# Patient Record
Sex: FEMALE | Race: WHITE | Employment: UNEMPLOYED | URBAN - METROPOLITAN AREA
[De-identification: names, ages, dates, MRNs, and addresses within clinical notes are randomized per-mention and may not be internally consistent; named-entity substitution may affect disease eponyms.]

---

## 2017-01-03 ENCOUNTER — ROUTINE PRENATAL (OUTPATIENT)
Dept: OBGYN CLINIC | Facility: CLINIC | Age: 32
End: 2017-01-03

## 2017-01-03 VITALS
WEIGHT: 133 LBS | DIASTOLIC BLOOD PRESSURE: 68 MMHG | BODY MASS INDEX: 23 KG/M2 | SYSTOLIC BLOOD PRESSURE: 106 MMHG | HEART RATE: 68 BPM

## 2017-01-03 DIAGNOSIS — Z34.92 ENCOUNTER FOR SUPERVISION OF NORMAL PREGNANCY IN SECOND TRIMESTER, UNSPECIFIED GRAVIDITY: Primary | ICD-10-CM

## 2017-01-03 LAB
MULTISTIX LOT#: NORMAL NUMERIC
PH, URINE: 5 (ref 4.5–8)
SPECIFIC GRAVITY: 1.03 (ref 1–1.03)
UROBILINOGEN,SEMI-QN: 0.2 MG/DL (ref 0–1.9)

## 2017-01-09 ENCOUNTER — HOSPITAL ENCOUNTER (OUTPATIENT)
Dept: ULTRASOUND IMAGING | Facility: HOSPITAL | Age: 32
Discharge: HOME | End: 2017-01-09
Attending: OBSTETRICS & GYNECOLOGY
Payer: COMMERCIAL

## 2017-01-09 DIAGNOSIS — Z34.92 ENCOUNTER FOR SUPERVISION OF NORMAL PREGNANCY IN SECOND TRIMESTER, UNSPECIFIED GRAVIDITY: ICD-10-CM

## 2017-01-09 PROCEDURE — 76805 OB US >/= 14 WKS SNGL FETUS: CPT

## 2017-01-31 ENCOUNTER — ROUTINE PRENATAL (OUTPATIENT)
Dept: OBGYN CLINIC | Facility: CLINIC | Age: 32
End: 2017-01-31

## 2017-01-31 ENCOUNTER — TELEPHONE (OUTPATIENT)
Dept: OBGYN CLINIC | Facility: CLINIC | Age: 32
End: 2017-01-31

## 2017-01-31 VITALS
WEIGHT: 136 LBS | DIASTOLIC BLOOD PRESSURE: 66 MMHG | SYSTOLIC BLOOD PRESSURE: 104 MMHG | HEART RATE: 76 BPM | BODY MASS INDEX: 23 KG/M2

## 2017-01-31 DIAGNOSIS — Z34.02 ENCOUNTER FOR SUPERVISION OF NORMAL FIRST PREGNANCY IN SECOND TRIMESTER: Primary | ICD-10-CM

## 2017-01-31 LAB
APPEARANCE: CLEAR
MULTISTIX LOT#: NORMAL NUMERIC
URINE-COLOR: YELLOW

## 2017-02-01 NOTE — PROGRESS NOTES
Reviewed US with patient. Patient received 24-28 week lab orders. Reviewed increased hydration.    RTC 4 wks

## 2017-02-23 ENCOUNTER — TELEPHONE (OUTPATIENT)
Dept: OBGYN CLINIC | Facility: CLINIC | Age: 32
End: 2017-02-23

## 2017-02-24 ENCOUNTER — APPOINTMENT (OUTPATIENT)
Dept: LAB | Facility: HOSPITAL | Age: 32
End: 2017-02-24
Attending: OBSTETRICS & GYNECOLOGY
Payer: COMMERCIAL

## 2017-02-24 DIAGNOSIS — Z34.02 ENCOUNTER FOR SUPERVISION OF NORMAL FIRST PREGNANCY IN SECOND TRIMESTER: ICD-10-CM

## 2017-02-24 LAB
ERYTHROCYTE [DISTWIDTH] IN BLOOD BY AUTOMATED COUNT: 13.2 % (ref 11–15)
GLUCOSE 1H P 50 G GLC PO SERPL-MCNC: 89 MG/DL
HCT VFR BLD AUTO: 36.2 % (ref 35–48)
HGB BLD-MCNC: 12.5 G/DL (ref 12–16)
MCH RBC QN AUTO: 32.4 PG (ref 27–32)
MCHC RBC AUTO-ENTMCNC: 34.4 G/DL (ref 32–37)
MCV RBC AUTO: 94.2 FL (ref 80–100)
PLATELET # BLD AUTO: 182 K/UL (ref 140–400)
PMV BLD AUTO: 8 FL (ref 7.4–10.3)
RBC # BLD AUTO: 3.84 M/UL (ref 3.7–5.4)
WBC # BLD AUTO: 13.8 K/UL (ref 4–11)

## 2017-02-24 PROCEDURE — 82950 GLUCOSE TEST: CPT

## 2017-02-24 PROCEDURE — 36415 COLL VENOUS BLD VENIPUNCTURE: CPT

## 2017-02-24 PROCEDURE — 85027 COMPLETE CBC AUTOMATED: CPT

## 2017-03-01 ENCOUNTER — ROUTINE PRENATAL (OUTPATIENT)
Dept: OBGYN CLINIC | Facility: CLINIC | Age: 32
End: 2017-03-01

## 2017-03-01 VITALS
DIASTOLIC BLOOD PRESSURE: 72 MMHG | BODY MASS INDEX: 25 KG/M2 | HEART RATE: 86 BPM | WEIGHT: 143 LBS | SYSTOLIC BLOOD PRESSURE: 107 MMHG

## 2017-03-01 DIAGNOSIS — Z34.92 ENCOUNTER FOR SUPERVISION OF NORMAL PREGNANCY IN SECOND TRIMESTER, UNSPECIFIED GRAVIDITY: Primary | ICD-10-CM

## 2017-03-01 LAB
APPEARANCE: CLEAR
MULTISTIX EXPIRATION DATE: ABNORMAL DATE
MULTISTIX LOT#: ABNORMAL NUMERIC
PH, URINE: 5 (ref 4.5–8)
SPECIFIC GRAVITY: 1 (ref 1–1.03)
URINE-COLOR: YELLOW

## 2017-03-13 ENCOUNTER — ROUTINE PRENATAL (OUTPATIENT)
Dept: OBGYN CLINIC | Facility: CLINIC | Age: 32
End: 2017-03-13

## 2017-03-13 VITALS
SYSTOLIC BLOOD PRESSURE: 102 MMHG | BODY MASS INDEX: 25 KG/M2 | HEART RATE: 72 BPM | DIASTOLIC BLOOD PRESSURE: 74 MMHG | WEIGHT: 143 LBS

## 2017-03-13 DIAGNOSIS — Z34.03 ENCOUNTER FOR SUPERVISION OF NORMAL FIRST PREGNANCY IN THIRD TRIMESTER: Primary | ICD-10-CM

## 2017-03-13 LAB
APPEARANCE: CLEAR
MULTISTIX LOT#: NORMAL NUMERIC
URINE-COLOR: YELLOW

## 2017-03-13 PROCEDURE — 90471 IMMUNIZATION ADMIN: CPT | Performed by: OBSTETRICS & GYNECOLOGY

## 2017-03-13 PROCEDURE — 90715 TDAP VACCINE 7 YRS/> IM: CPT | Performed by: OBSTETRICS & GYNECOLOGY

## 2017-03-13 NOTE — PROGRESS NOTES
PT SIGNED TDAP CONSENT AND WAS GIVEN TDAP INFO SHEET. PT TOLERATED INJECTION WITHOUT INCIDENT. ADVISED TO CALL IF ANY QUESTIONS OR CONCERNS.

## 2017-03-31 ENCOUNTER — ROUTINE PRENATAL (OUTPATIENT)
Dept: OBGYN CLINIC | Facility: CLINIC | Age: 32
End: 2017-03-31

## 2017-03-31 VITALS
BODY MASS INDEX: 25 KG/M2 | DIASTOLIC BLOOD PRESSURE: 68 MMHG | HEART RATE: 80 BPM | WEIGHT: 144 LBS | SYSTOLIC BLOOD PRESSURE: 109 MMHG

## 2017-03-31 DIAGNOSIS — Z34.83 ENCOUNTER FOR SUPERVISION OF OTHER NORMAL PREGNANCY IN THIRD TRIMESTER: Primary | ICD-10-CM

## 2017-03-31 LAB
APPEARANCE: CLEAR
MULTISTIX LOT#: NORMAL NUMERIC
PH, URINE: 5 (ref 4.5–8)
SPECIFIC GRAVITY: 1.01 (ref 1–1.03)
URINE-COLOR: YELLOW

## 2017-04-13 ENCOUNTER — ROUTINE PRENATAL (OUTPATIENT)
Dept: OBGYN CLINIC | Facility: CLINIC | Age: 32
End: 2017-04-13

## 2017-04-13 VITALS
SYSTOLIC BLOOD PRESSURE: 108 MMHG | BODY MASS INDEX: 26 KG/M2 | HEART RATE: 91 BPM | WEIGHT: 149 LBS | DIASTOLIC BLOOD PRESSURE: 68 MMHG

## 2017-04-13 DIAGNOSIS — Z34.93 ENCOUNTER FOR SUPERVISION OF NORMAL PREGNANCY IN THIRD TRIMESTER, UNSPECIFIED GRAVIDITY: Primary | ICD-10-CM

## 2017-04-21 ENCOUNTER — APPOINTMENT (OUTPATIENT)
Dept: LAB | Facility: HOSPITAL | Age: 32
End: 2017-04-21
Attending: OBSTETRICS & GYNECOLOGY
Payer: COMMERCIAL

## 2017-04-21 DIAGNOSIS — Z34.93 ENCOUNTER FOR SUPERVISION OF NORMAL PREGNANCY IN THIRD TRIMESTER, UNSPECIFIED GRAVIDITY: ICD-10-CM

## 2017-04-21 PROCEDURE — 36415 COLL VENOUS BLD VENIPUNCTURE: CPT

## 2017-04-21 PROCEDURE — 85027 COMPLETE CBC AUTOMATED: CPT

## 2017-04-21 PROCEDURE — 86780 TREPONEMA PALLIDUM: CPT

## 2017-04-25 ENCOUNTER — ROUTINE PRENATAL (OUTPATIENT)
Dept: OBGYN CLINIC | Facility: CLINIC | Age: 32
End: 2017-04-25

## 2017-04-25 VITALS
WEIGHT: 150 LBS | BODY MASS INDEX: 26 KG/M2 | DIASTOLIC BLOOD PRESSURE: 69 MMHG | HEART RATE: 79 BPM | SYSTOLIC BLOOD PRESSURE: 104 MMHG

## 2017-04-25 DIAGNOSIS — Z34.93 ENCOUNTER FOR SUPERVISION OF NORMAL PREGNANCY IN THIRD TRIMESTER, UNSPECIFIED GRAVIDITY: Primary | ICD-10-CM

## 2017-04-25 NOTE — PROGRESS NOTES
GBS done today. Reviewed 35 week labs with the patient. Discussed kick counts. She has no complaints.    RTC 1 wk

## 2017-05-03 ENCOUNTER — ROUTINE PRENATAL (OUTPATIENT)
Dept: OBGYN CLINIC | Facility: CLINIC | Age: 32
End: 2017-05-03

## 2017-05-03 ENCOUNTER — TELEPHONE (OUTPATIENT)
Dept: OBGYN CLINIC | Facility: CLINIC | Age: 32
End: 2017-05-03

## 2017-05-03 ENCOUNTER — HOSPITAL ENCOUNTER (OUTPATIENT)
Dept: ULTRASOUND IMAGING | Facility: HOSPITAL | Age: 32
Discharge: HOME | End: 2017-05-03
Attending: OBSTETRICS & GYNECOLOGY
Payer: COMMERCIAL

## 2017-05-03 VITALS
WEIGHT: 152 LBS | DIASTOLIC BLOOD PRESSURE: 69 MMHG | SYSTOLIC BLOOD PRESSURE: 96 MMHG | BODY MASS INDEX: 26 KG/M2 | HEART RATE: 92 BPM

## 2017-05-03 DIAGNOSIS — O26.843 SIGNIFICANT DISCREPANCY BETWEEN UTERINE SIZE AND CLINICAL DATES, ANTEPARTUM, THIRD TRIMESTER: ICD-10-CM

## 2017-05-03 DIAGNOSIS — Z34.93 ENCOUNTER FOR SUPERVISION OF NORMAL PREGNANCY IN THIRD TRIMESTER, UNSPECIFIED GRAVIDITY: Primary | ICD-10-CM

## 2017-05-03 PROBLEM — O26.849 SIGNIFICANT DISCREPANCY BETWEEN UTERINE SIZE AND CLINICAL DATES, ANTEPARTUM: Status: ACTIVE | Noted: 2017-05-03

## 2017-05-03 PROBLEM — O26.849 SIGNIFICANT DISCREPANCY BETWEEN UTERINE SIZE AND CLINICAL DATES, ANTEPARTUM (HCC): Status: ACTIVE | Noted: 2017-05-03

## 2017-05-03 PROCEDURE — 76816 OB US FOLLOW-UP PER FETUS: CPT

## 2017-05-04 NOTE — PROGRESS NOTES
Bevtoft at visit. GBS neg. Discussed S<D and office HANNA gives:  8.3, 2.9, 3.2, 7.2 =  21.6 cm. Mayo ESTEVES now at [address] [place] [address] as hold and call for me.

## 2017-05-04 NOTE — TELEPHONE ENCOUNTER
Lmtcb. Patient's order routed to dmg and patient can call [phone] to schedule the appointment.

## 2017-05-04 NOTE — TELEPHONE ENCOUNTER
Paged with stat US results giving normal growth at 52% / HANNA = 16 cm but incidental finding of right renal pelviectasis at 10.3mm. I discussed with Jimbo Chiang and Linda Unger that we'll do f/u with [facility].  [facility] for right renal fet

## 2017-05-05 ENCOUNTER — TELEPHONE (OUTPATIENT)
Dept: OBGYN CLINIC | Facility: CLINIC | Age: 32
End: 2017-05-05

## 2017-05-05 ENCOUNTER — LAB ENCOUNTER (OUTPATIENT)
Dept: LAB | Facility: HOSPITAL | Age: 32
End: 2017-05-05
Attending: OBSTETRICS & GYNECOLOGY
Payer: COMMERCIAL

## 2017-05-05 DIAGNOSIS — D72.829 LEUKOCYTOSIS, UNSPECIFIED TYPE: Primary | ICD-10-CM

## 2017-05-05 DIAGNOSIS — D72.829 LEUKOCYTOSIS, UNSPECIFIED TYPE: ICD-10-CM

## 2017-05-05 PROCEDURE — 85025 COMPLETE CBC W/AUTO DIFF WBC: CPT

## 2017-05-05 PROCEDURE — 36415 COLL VENOUS BLD VENIPUNCTURE: CPT

## 2017-05-05 NOTE — TELEPHONE ENCOUNTER
----- Message from Alia Wang DO sent at 5/4/2017  9:35 PM CDT -----  Please repeat cbc in 1-2 wks from the last.  WBCs elevated. Please see if patient recently sick when blood work drawn.

## 2017-05-09 ENCOUNTER — HOSPITAL ENCOUNTER (OUTPATIENT)
Dept: PERINATAL CARE | Facility: HOSPITAL | Age: 32
Discharge: HOME | End: 2017-05-09
Attending: OBSTETRICS & GYNECOLOGY
Payer: COMMERCIAL

## 2017-05-09 ENCOUNTER — HOSPITAL ENCOUNTER (OUTPATIENT)
Facility: HOSPITAL | Age: 32
Discharge: HOME | End: 2017-05-09
Attending: OBSTETRICS & GYNECOLOGY | Admitting: OBSTETRICS & GYNECOLOGY
Payer: COMMERCIAL

## 2017-05-09 VITALS — HEART RATE: 79 BPM | DIASTOLIC BLOOD PRESSURE: 77 MMHG | SYSTOLIC BLOOD PRESSURE: 116 MMHG

## 2017-05-09 DIAGNOSIS — O26.843 SIGNIFICANT DISCREPANCY BETWEEN UTERINE SIZE AND CLINICAL DATES, ANTEPARTUM, THIRD TRIMESTER: ICD-10-CM

## 2017-05-09 DIAGNOSIS — O35.8XX0 ULTRASOUND RECHECK OF FETAL PYELECTASIS, ANTEPARTUM, NOT APPLICABLE OR UNSPECIFIED FETUS: ICD-10-CM

## 2017-05-09 DIAGNOSIS — O26.843 SIGNIFICANT DISCREPANCY BETWEEN UTERINE SIZE AND CLINICAL DATES, ANTEPARTUM, THIRD TRIMESTER: Primary | ICD-10-CM

## 2017-05-09 PROCEDURE — 76811 OB US DETAILED SNGL FETUS: CPT | Performed by: OBSTETRICS & GYNECOLOGY

## 2017-05-09 PROCEDURE — 99241 OFFICE CONSULTATION,LEVEL I: CPT | Performed by: OBSTETRICS & GYNECOLOGY

## 2017-05-09 PROCEDURE — 76819 FETAL BIOPHYS PROFIL W/O NST: CPT | Performed by: OBSTETRICS & GYNECOLOGY

## 2017-05-09 NOTE — PROGRESS NOTES
Outpatient Maternal-Fetal Medicine Consultation    Dear Dr. Juanis Gomez,    Thank you for requesting ultrasound evaluation and maternal fetal medicine consultation on your patient Ramin Petersen.   As you are aware she is a 29year old female with a Singleto fetal measurements consistent with dates and normal detailed fetal anatomic evaluation. BPP is 8/8. EFW 6 lbs 3 oz (22nd percentile). I interpreted the results and reviewed them with the patient and her , Saman Godinez.       DISCUSSION  During her visit M.D.    The majority of the time (>50%) was spent in review of records, consultation and coordination of care. Our discussion is summarized above. The approximate physician face-to-face time was 20 minutes.

## 2017-05-09 NOTE — TELEPHONE ENCOUNTER
Repeat CBC done on 5/5/17 is resulted. Routed to [address] [place] [address] to please review and advise.

## 2017-05-10 ENCOUNTER — ROUTINE PRENATAL (OUTPATIENT)
Dept: OBGYN CLINIC | Facility: CLINIC | Age: 32
End: 2017-05-10

## 2017-05-10 VITALS
SYSTOLIC BLOOD PRESSURE: 106 MMHG | WEIGHT: 152.38 LBS | BODY MASS INDEX: 26 KG/M2 | DIASTOLIC BLOOD PRESSURE: 70 MMHG | HEART RATE: 85 BPM

## 2017-05-10 DIAGNOSIS — Z34.93 ENCOUNTER FOR SUPERVISION OF NORMAL PREGNANCY IN THIRD TRIMESTER, UNSPECIFIED GRAVIDITY: Primary | ICD-10-CM

## 2017-05-10 PROBLEM — O35.8XX0 PYELECTASIS OF FETUS ON PRENATAL ULTRASOUND: Status: ACTIVE | Noted: 2017-05-10

## 2017-05-10 PROBLEM — O35.EXX0 PYELECTASIS OF FETUS ON PRENATAL ULTRASOUND (HCC): Status: ACTIVE | Noted: 2017-05-10

## 2017-05-10 PROBLEM — O35.EXX0 PYELECTASIS OF FETUS ON PRENATAL ULTRASOUND: Status: ACTIVE | Noted: 2017-05-10

## 2017-05-10 NOTE — ADDENDUM NOTE
Encounter addended by: Charlotte Gilbert on: 5/10/2017  8:39 AM<BR>     Documentation filed: Charges VN

## 2017-05-10 NOTE — PROGRESS NOTES
CBC reviewed and WBC = 15.7 which is variant of pregnancy ( down from 18K ). Normal differential.  I'll bring to group discussion. I had reviewed Level 1 result over phone on day of US. Showed right pyelectasis at 10.3 mm.  Sent for MFM scan yesterday and E

## 2017-05-18 ENCOUNTER — ROUTINE PRENATAL (OUTPATIENT)
Dept: OBGYN CLINIC | Facility: CLINIC | Age: 32
End: 2017-05-18

## 2017-05-18 VITALS
DIASTOLIC BLOOD PRESSURE: 72 MMHG | WEIGHT: 152.19 LBS | HEART RATE: 77 BPM | BODY MASS INDEX: 26 KG/M2 | SYSTOLIC BLOOD PRESSURE: 106 MMHG

## 2017-05-18 DIAGNOSIS — Z34.83 ENCOUNTER FOR SUPERVISION OF OTHER NORMAL PREGNANCY IN THIRD TRIMESTER: Primary | ICD-10-CM

## 2017-05-25 ENCOUNTER — ROUTINE PRENATAL (OUTPATIENT)
Dept: OBGYN CLINIC | Facility: CLINIC | Age: 32
End: 2017-05-25

## 2017-05-25 VITALS
WEIGHT: 154 LBS | BODY MASS INDEX: 26 KG/M2 | SYSTOLIC BLOOD PRESSURE: 108 MMHG | HEART RATE: 80 BPM | DIASTOLIC BLOOD PRESSURE: 71 MMHG

## 2017-05-25 DIAGNOSIS — Z34.93 ENCOUNTER FOR SUPERVISION OF NORMAL PREGNANCY IN THIRD TRIMESTER, UNSPECIFIED GRAVIDITY: Primary | ICD-10-CM

## 2017-06-01 ENCOUNTER — HOSPITAL ENCOUNTER (OUTPATIENT)
Facility: HOSPITAL | Age: 32
Discharge: HOME | End: 2017-06-01
Attending: OBSTETRICS & GYNECOLOGY | Admitting: OBSTETRICS & GYNECOLOGY
Payer: COMMERCIAL

## 2017-06-01 ENCOUNTER — ROUTINE PRENATAL (OUTPATIENT)
Dept: OBGYN CLINIC | Facility: CLINIC | Age: 32
End: 2017-06-01

## 2017-06-01 VITALS
WEIGHT: 155.19 LBS | DIASTOLIC BLOOD PRESSURE: 78 MMHG | BODY MASS INDEX: 27 KG/M2 | SYSTOLIC BLOOD PRESSURE: 113 MMHG | HEART RATE: 78 BPM

## 2017-06-01 VITALS — DIASTOLIC BLOOD PRESSURE: 78 MMHG | HEART RATE: 70 BPM | SYSTOLIC BLOOD PRESSURE: 129 MMHG

## 2017-06-01 DIAGNOSIS — Z34.83 ENCOUNTER FOR SUPERVISION OF OTHER NORMAL PREGNANCY IN THIRD TRIMESTER: Primary | ICD-10-CM

## 2017-06-01 PROCEDURE — 59426 ANTEPARTUM CARE ONLY: CPT | Performed by: OBSTETRICS & GYNECOLOGY

## 2017-06-01 PROCEDURE — 59025 FETAL NON-STRESS TEST: CPT | Performed by: OBSTETRICS & GYNECOLOGY

## 2017-06-01 NOTE — PROGRESS NOTES
No issues reported. HANNA: 20cm. To [facility] for NST. IOL Monday night ripening @ 6pm.  POC disucssed with patient. Precautions given.

## 2017-06-01 NOTE — NST
Nonstress Test   Patient: Kaitlynn Saenz    Gestation: 40w6d    NST: postdates       Variability: Moderate           Accelerations: Yes           Decelerations: None            Baseline: 125 BPM           Uterine Irritability: No           Contractions:

## 2017-06-02 ENCOUNTER — TELEPHONE (OUTPATIENT)
Dept: OBGYN CLINIC | Facility: CLINIC | Age: 32
End: 2017-06-02

## 2017-06-02 NOTE — TELEPHONE ENCOUNTER
May from [facility] states IOL spot for Sunday night has opened up and Reilly Penny has been moved from Monday to Sunday night.   Pt aware of IOL @ 7pm.

## 2017-06-04 ENCOUNTER — HOSPITAL ENCOUNTER (INPATIENT)
Facility: HOSPITAL | Age: 32
LOS: 3 days | Discharge: HOME | End: 2017-06-07
Attending: OBSTETRICS & GYNECOLOGY | Admitting: OBSTETRICS & GYNECOLOGY
Payer: COMMERCIAL

## 2017-06-04 ENCOUNTER — HOSPITAL ENCOUNTER (INPATIENT)
Dept: OBGYN CLINIC | Facility: HOSPITAL | Age: 32
Discharge: HOME | End: 2017-06-04
Payer: COMMERCIAL

## 2017-06-04 PROBLEM — O48.0 41 WEEKS GESTATION OF PREGNANCY: Status: ACTIVE | Noted: 2017-06-04

## 2017-06-04 PROBLEM — Z3A.41 41 WEEKS GESTATION OF PREGNANCY: Status: ACTIVE | Noted: 2017-06-04

## 2017-06-04 PROBLEM — Z3A.41 41 WEEKS GESTATION OF PREGNANCY (HCC): Status: ACTIVE | Noted: 2017-06-04

## 2017-06-04 PROBLEM — O48.0 41 WEEKS GESTATION OF PREGNANCY (HCC): Status: ACTIVE | Noted: 2017-06-04

## 2017-06-04 PROCEDURE — 3E0P7GC INTRODUCTION OF OTHER THERAPEUTIC SUBSTANCE INTO FEMALE REPRODUCTIVE, VIA NATURAL OR ARTIFICIAL OPENING: ICD-10-PCS | Performed by: OBSTETRICS & GYNECOLOGY

## 2017-06-04 RX ORDER — IBUPROFEN 600 MG/1
600 TABLET ORAL ONCE AS NEEDED
Status: DISCONTINUED | OUTPATIENT
Start: 2017-06-04 | End: 2017-06-06

## 2017-06-04 RX ORDER — AMMONIA INHALANTS 0.04 G/.3ML
0.3 INHALANT RESPIRATORY (INHALATION) AS NEEDED
Status: DISCONTINUED | OUTPATIENT
Start: 2017-06-04 | End: 2017-06-06

## 2017-06-04 RX ORDER — TERBUTALINE SULFATE 1 MG/ML
0.25 INJECTION, SOLUTION SUBCUTANEOUS AS NEEDED
Status: DISCONTINUED | OUTPATIENT
Start: 2017-06-04 | End: 2017-06-06

## 2017-06-04 RX ORDER — SODIUM CHLORIDE 0.9 % (FLUSH) 0.9 %
10 SYRINGE (ML) INJECTION AS NEEDED
Status: DISCONTINUED | OUTPATIENT
Start: 2017-06-04 | End: 2017-06-06

## 2017-06-04 RX ORDER — DEXTROSE, SODIUM CHLORIDE, SODIUM LACTATE, POTASSIUM CHLORIDE, AND CALCIUM CHLORIDE 5; .6; .31; .03; .02 G/100ML; G/100ML; G/100ML; G/100ML; G/100ML
125 INJECTION, SOLUTION INTRAVENOUS CONTINUOUS
Status: DISCONTINUED | OUTPATIENT
Start: 2017-06-04 | End: 2017-06-06

## 2017-06-04 RX ORDER — LIDOCAINE HYDROCHLORIDE 10 MG/ML
30 INJECTION, SOLUTION EPIDURAL; INFILTRATION; INTRACAUDAL; PERINEURAL ONCE
Status: DISCONTINUED | OUTPATIENT
Start: 2017-06-04 | End: 2017-06-06

## 2017-06-05 ENCOUNTER — ANESTHESIA (OUTPATIENT)
Dept: OBGYN UNIT | Facility: HOSPITAL | Age: 32
End: 2017-06-05
Payer: COMMERCIAL

## 2017-06-05 ENCOUNTER — ANESTHESIA EVENT (OUTPATIENT)
Dept: OBGYN UNIT | Facility: HOSPITAL | Age: 32
End: 2017-06-05
Payer: COMMERCIAL

## 2017-06-05 PROCEDURE — 3E033VJ INTRODUCTION OF OTHER HORMONE INTO PERIPHERAL VEIN, PERCUTANEOUS APPROACH: ICD-10-PCS | Performed by: OBSTETRICS & GYNECOLOGY

## 2017-06-05 PROCEDURE — 0HQ9XZZ REPAIR PERINEUM SKIN, EXTERNAL APPROACH: ICD-10-PCS | Performed by: OBSTETRICS & GYNECOLOGY

## 2017-06-05 RX ORDER — SODIUM CHLORIDE, SODIUM LACTATE, POTASSIUM CHLORIDE, CALCIUM CHLORIDE 600; 310; 30; 20 MG/100ML; MG/100ML; MG/100ML; MG/100ML
INJECTION, SOLUTION INTRAVENOUS
Status: COMPLETED
Start: 2017-06-05 | End: 2017-06-06

## 2017-06-05 RX ORDER — BUPIVACAINE HYDROCHLORIDE 2.5 MG/ML
INJECTION, SOLUTION EPIDURAL; INFILTRATION; INTRACAUDAL AS NEEDED
Status: DISCONTINUED | OUTPATIENT
Start: 2017-06-05 | End: 2017-06-06 | Stop reason: SURG

## 2017-06-05 RX ORDER — NALBUPHINE HCL 10 MG/ML
2.5 AMPUL (ML) INJECTION
Status: DISCONTINUED | OUTPATIENT
Start: 2017-06-05 | End: 2017-06-07

## 2017-06-05 RX ORDER — DEXTROSE, SODIUM CHLORIDE, SODIUM LACTATE, POTASSIUM CHLORIDE, AND CALCIUM CHLORIDE 5; .6; .31; .03; .02 G/100ML; G/100ML; G/100ML; G/100ML; G/100ML
INJECTION, SOLUTION INTRAVENOUS
Status: DISPENSED
Start: 2017-06-05 | End: 2017-06-05

## 2017-06-05 RX ORDER — EPHEDRINE SULFATE/0.9% NACL/PF 25 MG/5 ML
5 SYRINGE (ML) INTRAVENOUS AS NEEDED
Status: DISCONTINUED | OUTPATIENT
Start: 2017-06-05 | End: 2017-06-06

## 2017-06-05 RX ORDER — NALBUPHINE HCL 10 MG/ML
10 AMPUL (ML) INJECTION ONCE
Status: COMPLETED | OUTPATIENT
Start: 2017-06-05 | End: 2017-06-05

## 2017-06-05 RX ORDER — BUPIVACAINE HYDROCHLORIDE 2.5 MG/ML
INJECTION, SOLUTION EPIDURAL; INFILTRATION; INTRACAUDAL
Status: DISPENSED
Start: 2017-06-05 | End: 2017-06-06

## 2017-06-05 RX ORDER — EPHEDRINE SULFATE/0.9% NACL/PF 25 MG/5 ML
SYRINGE (ML) INTRAVENOUS
Status: DISCONTINUED
Start: 2017-06-05 | End: 2017-06-06 | Stop reason: WASHOUT

## 2017-06-05 RX ORDER — HYDROXYZINE HYDROCHLORIDE 50 MG/ML
50 INJECTION, SOLUTION INTRAMUSCULAR ONCE AS NEEDED
Status: COMPLETED | OUTPATIENT
Start: 2017-06-05 | End: 2017-06-05

## 2017-06-05 RX ORDER — SODIUM CHLORIDE, SODIUM LACTATE, POTASSIUM CHLORIDE, CALCIUM CHLORIDE 600; 310; 30; 20 MG/100ML; MG/100ML; MG/100ML; MG/100ML
INJECTION, SOLUTION INTRAVENOUS
Status: COMPLETED
Start: 2017-06-05 | End: 2017-06-05

## 2017-06-05 RX ADMIN — BUPIVACAINE HYDROCHLORIDE 10 ML: 2.5 INJECTION, SOLUTION EPIDURAL; INFILTRATION; INTRACAUDAL at 20:17:00

## 2017-06-05 NOTE — H&P
[facility] Patient Status:  Inpatient    1985 MRN [id number]   Location [address] C-D Attending Khadijah Reed, [facility] Day # 1 PCP No primary care provider on file.      Marylin Jamil daily.   Disp:  Rfl:  Taking   Acetaminophen (TYLENOL OR) Take 2 tablets by mouth as needed. Disp:  Rfl:  Taking   PRENATAL 27-0.8 MG Oral Tab Take 1 tablet by mouth daily.  Disp:  Rfl:  Taking     Allergies:   Z-Larry [Zithromax]       Pain    OBJECTIVE:

## 2017-06-06 PROCEDURE — 59410 OBSTETRICAL CARE: CPT | Performed by: OBSTETRICS & GYNECOLOGY

## 2017-06-06 RX ORDER — IBUPROFEN 600 MG/1
600 TABLET ORAL EVERY 6 HOURS PRN
Status: DISCONTINUED | OUTPATIENT
Start: 2017-06-06 | End: 2017-06-07

## 2017-06-06 RX ORDER — SODIUM CHLORIDE 0.9 % (FLUSH) 0.9 %
10 SYRINGE (ML) INJECTION AS NEEDED
Status: DISCONTINUED | OUTPATIENT
Start: 2017-06-06 | End: 2017-06-07

## 2017-06-06 RX ORDER — DIAPER,BRIEF,INFANT-TODD,DISP
1 EACH MISCELLANEOUS EVERY 6 HOURS PRN
Status: DISCONTINUED | OUTPATIENT
Start: 2017-06-06 | End: 2017-06-07

## 2017-06-06 RX ORDER — HYDROCODONE BITARTRATE AND ACETAMINOPHEN 5; 325 MG/1; MG/1
1 TABLET ORAL EVERY 6 HOURS PRN
Status: DISCONTINUED | OUTPATIENT
Start: 2017-06-06 | End: 2017-06-07

## 2017-06-06 RX ORDER — SIMETHICONE 80 MG
80 TABLET,CHEWABLE ORAL 3 TIMES DAILY PRN
Status: DISCONTINUED | OUTPATIENT
Start: 2017-06-06 | End: 2017-06-07

## 2017-06-06 RX ORDER — DOCUSATE SODIUM 100 MG/1
100 CAPSULE, LIQUID FILLED ORAL DAILY
Status: DISCONTINUED | OUTPATIENT
Start: 2017-06-06 | End: 2017-06-07

## 2017-06-06 RX ORDER — AMMONIA INHALANTS 0.04 G/.3ML
0.3 INHALANT RESPIRATORY (INHALATION) AS NEEDED
Status: DISCONTINUED | OUTPATIENT
Start: 2017-06-06 | End: 2017-06-07

## 2017-06-06 RX ORDER — LIDOCAINE HYDROCHLORIDE 10 MG/ML
INJECTION, SOLUTION EPIDURAL; INFILTRATION; INTRACAUDAL; PERINEURAL
Status: DISPENSED
Start: 2017-06-06 | End: 2017-06-06

## 2017-06-06 RX ORDER — ONDANSETRON 2 MG/ML
4 INJECTION INTRAMUSCULAR; INTRAVENOUS EVERY 6 HOURS PRN
Status: DISCONTINUED | OUTPATIENT
Start: 2017-06-06 | End: 2017-06-07

## 2017-06-06 NOTE — PROGRESS NOTES
Patient received into room [351] via wheelchair .    Bedside report received from [nima],RN.  Bed in locked and low position.  Side rails up x2.  Vitals signs within normal limits, fundus firm at U/U, lochia small, no clots noted.  IV site unremarkable.  P

## 2017-06-06 NOTE — PROGRESS NOTES
[facility]    Labor Progress Note    Zeynep Arreola Patient Status:  Inpatient    1985 MRN [id number]   Location [address] C-D Attending Emery Scales, [address] [facility] Day # 2 PCP No primary care provider on file.

## 2017-06-06 NOTE — PROGRESS NOTES
[facility]    Labor Progress Note    Renetta Coil Patient Status:  Inpatient    1985 MRN [id number]   Location [address] C-D Attending Venu Mccabe, [address] [facility] Day # 1 PCP No primary care provider on file.

## 2017-06-06 NOTE — DISCHARGE SUMMARY
[facility]    Discharge Summary    Zeynep Arreola Patient Status:  Inpatient    1985 MRN [id number]   Location [address] C-D Attending Emery Scales, [address] [facility] Day # 2       Admit date:  2017    Discharge da

## 2017-06-06 NOTE — ANESTHESIA PREPROCEDURE EVALUATION
Anesthesia PreOp Note    HPI:     Aldo Deal is a 29year old female who presents for preoperative consultation requested by: * No surgeons listed *    Date of Surgery: 6/5/2017    * No procedures listed *  Indication: * No pre-op diagnosis entered Wale Haste, DO     dextrose 5 % /lactated ringers infusion 125 mL/hr Intravenous Continuous Wale Haste, DO Last Rate: 125 mL/hr at 06/05/17 1606 125 mL/hr at 06/05/17 1606   Lidocaine HCl (PF) (XYLOCAINE) 1 % injection SOLN 30 mL 30 mL Intr 32.7* 06/04/2017   MCHC 34.5 06/04/2017   RDW 13.1 06/04/2017    06/04/2017   MPV 9.1 06/04/2017             Vital Signs: There is no weight on file to calculate BMI. oral temperature is 98.2 °F (36.8 °C).  Her blood pressure is 132/41 and her pul

## 2017-06-06 NOTE — ANESTHESIA POSTPROCEDURE EVALUATION
Patient: Иван Mcclellan    Procedure Summary     Date Anesthesia Start Anesthesia Stop Room / Location    06/05/17 2016 0245 (06/06/17)        Procedure Diagnosis Scheduled Providers Responsible Provider    LABOR ANALGESIA No diagnosis on file.   Mikaela Garland

## 2017-06-06 NOTE — ANESTHESIA PROCEDURE NOTES
Labor Analgesia  Performed by: Julio Anderson  Authorized by: Julio Anderson    Patient Location:  OB  Start Time:  6/5/2017 8:12 PM  End Time:  6/5/2017 8:17 PM  Site Identification: surface landmarks    Anesthesiologist:  Julio Anderson  Perform

## 2017-06-06 NOTE — PROGRESS NOTES
[facility]OSP - [facility]    Vaginal Delivery Note    [facility] Patient Status:  Inpatient    1985 MRN [id number]   Location [address] C-D Attending Blane Jarquin, [address] [facility] Day # 2 PCP No primary care provider on file.

## 2017-06-06 NOTE — PROGRESS NOTES
[facility]    Labor Progress Note    Pink Anis Patient Status:  Inpatient    1985 MRN [id number]   Location [address] C-D Attending Scott Munguia, [address] [facility] Day # 1 PCP No primary care provider on file.

## 2017-06-07 VITALS
HEART RATE: 81 BPM | DIASTOLIC BLOOD PRESSURE: 58 MMHG | SYSTOLIC BLOOD PRESSURE: 100 MMHG | TEMPERATURE: 97 F | RESPIRATION RATE: 16 BRPM

## 2017-06-07 NOTE — LACTATION NOTE
LACTATION NOTE - MOTHER           Problems identified  Problems identified: Knowledge deficit    Maternal history  Maternal history: Induction of labor    Breastfeeding goal  Breastfeeding goal: To maintain breast milk feeding per patient goal    Maternal

## 2017-06-07 NOTE — PROGRESS NOTES
[facility] - [facility]    OB/Gyne Post  Progress Note      Dacia Barrientos Patient Status:  Inpatient    1985 MRN [id number]   Location [facility] 3SE Attending Mable Rasmussen, [address] [facility] Day # 3 PCP No primary care provider on

## 2017-07-10 ENCOUNTER — OFFICE VISIT (OUTPATIENT)
Dept: INTERNAL MEDICINE CLINIC | Facility: CLINIC | Age: 32
End: 2017-07-10

## 2017-07-10 VITALS
DIASTOLIC BLOOD PRESSURE: 72 MMHG | HEART RATE: 67 BPM | BODY MASS INDEX: 23 KG/M2 | WEIGHT: 135.13 LBS | TEMPERATURE: 98 F | SYSTOLIC BLOOD PRESSURE: 107 MMHG

## 2017-07-10 DIAGNOSIS — B07.0 PLANTAR WART, LEFT FOOT: Primary | ICD-10-CM

## 2017-07-10 PROCEDURE — 99214 OFFICE O/P EST MOD 30 MIN: CPT | Performed by: INTERNAL MEDICINE

## 2017-07-10 PROCEDURE — 99212 OFFICE O/P EST SF 10 MIN: CPT | Performed by: INTERNAL MEDICINE

## 2017-07-10 NOTE — PROGRESS NOTES
HPI:    Patient ID: Aldo Deal is a 29year old female. HPI     Warts   This is a new problem. A plantar wart on the left foot without tenderness. The problem has been unchanged. Pt reports no pain.  Pertinent negatives include no chest pain, fever 135 lb 1.6 oz (61.3 kg)         Body mass index is 23.19 kg/m².            ASSESSMENT/PLAN:   Plantar wart, left foot  (primary encounter diagnosis)     (B07.0) Plantar wart, left foot  (primary encounter diagnosis)  -A plantar wart on the left foot without

## 2017-07-25 ENCOUNTER — TELEPHONE (OUTPATIENT)
Dept: OBGYN CLINIC | Facility: CLINIC | Age: 32
End: 2017-07-25

## 2017-07-25 NOTE — TELEPHONE ENCOUNTER
Pt delivered 17, . Pt states that her bleeding tapered by . The week of  she started bleeding again, bright red flow. It seemed like a period and it stopped .   Pt states that she walks a lot in the house, because her baby likes wh

## 2017-07-25 NOTE — TELEPHONE ENCOUNTER
Sounds like a period. No recommendations. Keep PP appt on Sat.   If diarrhea worsens, can take Immodium and see PCP

## 2017-07-27 ENCOUNTER — TELEPHONE (OUTPATIENT)
Dept: OBGYN CLINIC | Facility: CLINIC | Age: 32
End: 2017-07-27

## 2017-07-27 NOTE — TELEPHONE ENCOUNTER
Pt wanted to know if it is safe to take Imodium while breastfeeding. Informed pt it is safe to take.  (It is a L2 med.)

## 2017-07-29 ENCOUNTER — POSTPARTUM (OUTPATIENT)
Dept: OBGYN CLINIC | Facility: CLINIC | Age: 32
End: 2017-07-29

## 2017-07-29 VITALS
WEIGHT: 134 LBS | HEART RATE: 77 BPM | BODY MASS INDEX: 23 KG/M2 | DIASTOLIC BLOOD PRESSURE: 68 MMHG | SYSTOLIC BLOOD PRESSURE: 101 MMHG

## 2017-07-29 PROBLEM — Z3A.41 41 WEEKS GESTATION OF PREGNANCY: Status: RESOLVED | Noted: 2017-06-04 | Resolved: 2017-07-29

## 2017-07-29 PROBLEM — O35.8XX0 PYELECTASIS OF FETUS ON PRENATAL ULTRASOUND: Status: RESOLVED | Noted: 2017-05-10 | Resolved: 2017-07-29

## 2017-07-29 PROBLEM — O35.EXX0 PYELECTASIS OF FETUS ON PRENATAL ULTRASOUND: Status: RESOLVED | Noted: 2017-05-10 | Resolved: 2017-07-29

## 2017-07-29 PROBLEM — O48.0 41 WEEKS GESTATION OF PREGNANCY: Status: RESOLVED | Noted: 2017-06-04 | Resolved: 2017-07-29

## 2017-07-29 PROBLEM — Z3A.41 41 WEEKS GESTATION OF PREGNANCY (HCC): Status: RESOLVED | Noted: 2017-06-04 | Resolved: 2017-07-29

## 2017-07-29 PROBLEM — O35.EXX0 PYELECTASIS OF FETUS ON PRENATAL ULTRASOUND (HCC): Status: RESOLVED | Noted: 2017-05-10 | Resolved: 2017-07-29

## 2017-07-29 PROBLEM — O26.849 SIGNIFICANT DISCREPANCY BETWEEN UTERINE SIZE AND CLINICAL DATES, ANTEPARTUM: Status: RESOLVED | Noted: 2017-05-03 | Resolved: 2017-07-29

## 2017-07-29 PROBLEM — O48.0 41 WEEKS GESTATION OF PREGNANCY (HCC): Status: RESOLVED | Noted: 2017-06-04 | Resolved: 2017-07-29

## 2017-07-29 PROBLEM — O26.849 SIGNIFICANT DISCREPANCY BETWEEN UTERINE SIZE AND CLINICAL DATES, ANTEPARTUM (HCC): Status: RESOLVED | Noted: 2017-05-03 | Resolved: 2017-07-29

## 2017-07-29 NOTE — PROGRESS NOTES
EUGENIO Carpio is a 29year old female  here for 6 week post-partum visit. Patient delivered a  female infant on 17 Marilyn [address] ). Patient desires condom. for contraception. Patient is breast & bottle feeding.    Patient denies sym

## 2017-08-26 ENCOUNTER — OFFICE VISIT (OUTPATIENT)
Dept: DERMATOLOGY CLINIC | Facility: CLINIC | Age: 32
End: 2017-08-26

## 2017-08-26 DIAGNOSIS — B07.0 PLANTAR WART: Primary | ICD-10-CM

## 2017-08-26 PROCEDURE — 17110 DESTRUCTION B9 LES UP TO 14: CPT | Performed by: DERMATOLOGY

## 2017-08-26 RX ORDER — ACETAMINOPHEN 325 MG/1
325 TABLET ORAL EVERY 6 HOURS PRN
COMMUNITY
End: 2021-03-03

## 2017-08-26 NOTE — PROGRESS NOTES
HPI:     Chief Complaint     Warts        HPI     Warts    Additional comments: New pt. Pt presents with concern of wart to left plantar foot. Pt has attempted to treat with OTC salicylic acid without improvement.         Last edited by Francisco Marmolejo RN lower edge of the ball of the foot      ASSESSMENT/PLAN:   Plantar wart  (primary encounter diagnosis)-less likely corn– The area is pared  and treated with cryotherapy. Patient will follow for retreatment 3-4 weeks unless they think it is resolved.   I ha

## 2017-08-26 NOTE — PROGRESS NOTES
Past Medical History:   Diagnosis Date   • Visual impairment      Past Surgical History:  No date: COLONOSCOPY  No date:     Social History  Social History   Marital status:   Spouse name: N/A    Years of education: N/A  Number of children: N/A

## 2017-10-07 ENCOUNTER — OFFICE VISIT (OUTPATIENT)
Dept: DERMATOLOGY CLINIC | Facility: CLINIC | Age: 32
End: 2017-10-07

## 2017-10-07 DIAGNOSIS — B07.0 PLANTAR WART: Primary | ICD-10-CM

## 2017-10-07 PROCEDURE — 17110 DESTRUCTION B9 LES UP TO 14: CPT | Performed by: DERMATOLOGY

## 2017-10-07 NOTE — PROGRESS NOTES
HPI:     Chief Complaint     Warts        HPI     Warts    Additional comments: pt here for 6 week f/u wart left foot       Last edited by Ayleen Vivar RN on 10/7/2017  9:07 AM. (History)          Patient thinks is getting smaller and hurts less.       Rev it is resolved. No orders of the defined types were placed in this encounter. Results From Past 48 Hours:  No results found for this or any previous visit (from the past 48 hour(s)).     Meds This Visit:      Imaging Orders:  None     Referral Order

## 2017-11-11 ENCOUNTER — OFFICE VISIT (OUTPATIENT)
Dept: INTERNAL MEDICINE CLINIC | Facility: CLINIC | Age: 32
End: 2017-11-11

## 2017-11-11 ENCOUNTER — LAB ENCOUNTER (OUTPATIENT)
Dept: LAB | Age: 32
End: 2017-11-11
Attending: INTERNAL MEDICINE
Payer: COMMERCIAL

## 2017-11-11 VITALS
WEIGHT: 126 LBS | DIASTOLIC BLOOD PRESSURE: 67 MMHG | HEART RATE: 76 BPM | SYSTOLIC BLOOD PRESSURE: 97 MMHG | BODY MASS INDEX: 21.51 KG/M2 | HEIGHT: 64 IN

## 2017-11-11 DIAGNOSIS — Z00.00 PHYSICAL EXAM: Primary | ICD-10-CM

## 2017-11-11 DIAGNOSIS — Z00.00 PHYSICAL EXAM: ICD-10-CM

## 2017-11-11 PROCEDURE — 36415 COLL VENOUS BLD VENIPUNCTURE: CPT

## 2017-11-11 PROCEDURE — 80061 LIPID PANEL: CPT

## 2017-11-11 PROCEDURE — 99395 PREV VISIT EST AGE 18-39: CPT | Performed by: INTERNAL MEDICINE

## 2017-11-11 PROCEDURE — 84443 ASSAY THYROID STIM HORMONE: CPT

## 2017-11-11 PROCEDURE — 85025 COMPLETE CBC W/AUTO DIFF WBC: CPT

## 2017-11-11 PROCEDURE — 80053 COMPREHEN METABOLIC PANEL: CPT

## 2017-11-11 NOTE — PROGRESS NOTES
Иван Mcclellan is a 29year old female.   Patient presents with:  Physical: health screening form       HPI:   Pt herer for physical   C/c physical   C/o Needs forms filled and labs         Current Outpatient Prescriptions:  acetaminophen (TYLENOL) 325 M edema  Back–no spinal or paraspinal tenderness  BREAST: Bilateral breasts without any lumps, bilateral axilla without any lymphadenopathy, no nipple retraction or  Discharge, breast fullness that she is still lactating      ASSESSMENT AND PLAN:   Diagnoses

## 2017-11-18 ENCOUNTER — OFFICE VISIT (OUTPATIENT)
Dept: DERMATOLOGY CLINIC | Facility: CLINIC | Age: 32
End: 2017-11-18

## 2017-11-18 DIAGNOSIS — B07.0 PLANTAR WART: Primary | ICD-10-CM

## 2017-11-18 PROCEDURE — 17110 DESTRUCTION B9 LES UP TO 14: CPT | Performed by: DERMATOLOGY

## 2017-11-18 NOTE — PROGRESS NOTES
HPI:     Chief Complaint     Warts        HPI     Warts    Additional comments: \"Established pt\"- LOV- 10-7-17. Pt presenting today for f/u with wart to left foot that was treated at [address] [place] [address] with cryo.         Last edited by Lazara Bowden, [address] [place] [address] Amira on 11/18/2017 1 (primary encounter diagnosis)-The area is pared  and treated with cryotherapy. Patient will follow for retreatment 3-4 weeks unless they think it is resolved.       Orders Placed This Encounter      dest benign <15    Results From Past 48 Hours:  No result

## 2017-12-07 ENCOUNTER — OFFICE VISIT (OUTPATIENT)
Dept: DERMATOLOGY CLINIC | Facility: CLINIC | Age: 32
End: 2017-12-07

## 2017-12-07 DIAGNOSIS — B07.0 PLANTAR WART: Primary | ICD-10-CM

## 2017-12-07 PROCEDURE — 17110 DESTRUCTION B9 LES UP TO 14: CPT | Performed by: DERMATOLOGY

## 2017-12-07 NOTE — PROGRESS NOTES
.  HPI:     Chief Complaint     Warts        HPI     Warts    Additional comments: \"Established pt\"- LOV- 11-18-17. Pt presenting today for f/u with plantar wart to left foot treated at [address] [place] [address] with cryo.         Last edited by Roosevelt Davis, [address] [place] [address] on 12/7/2017 and treated with cryotherapy. Patient will follow for retreatment 3-4 weeks unless they think it is resolved. No orders of the defined types were placed in this encounter.       Results From Past 48 Hours:  No results found for this or any previous vis

## 2018-01-11 ENCOUNTER — OFFICE VISIT (OUTPATIENT)
Dept: DERMATOLOGY CLINIC | Facility: CLINIC | Age: 33
End: 2018-01-11

## 2018-01-11 DIAGNOSIS — B07.0 PLANTAR WART: Primary | ICD-10-CM

## 2018-01-11 PROCEDURE — 17110 DESTRUCTION B9 LES UP TO 14: CPT | Performed by: DERMATOLOGY

## 2018-01-11 NOTE — PROGRESS NOTES
HPI:     Chief Complaint     Warts        HPI     Warts    Additional comments: LOV 12/7/2017. Pt presenting for 3-4 week f/u with wart to L plantar foot. Previously treated at [address] [place] [address] with cryotherapy.        Last edited by Silvino Madera LPN on 3/73/6368 1 encounter diagnosis)-The area is pared  and treated with cryotherapy. Patient will follow for retreatment 3-4 weeks unless they think it is resolved. No orders of the defined types were placed in this encounter.       Results From Past 48 Hours:  No res

## 2018-02-27 ENCOUNTER — OFFICE VISIT (OUTPATIENT)
Dept: DERMATOLOGY CLINIC | Facility: CLINIC | Age: 33
End: 2018-02-27

## 2018-02-27 DIAGNOSIS — B07.0 PLANTAR WART: Primary | ICD-10-CM

## 2018-02-27 PROCEDURE — 17110 DESTRUCTION B9 LES UP TO 14: CPT | Performed by: DERMATOLOGY

## 2018-02-27 NOTE — PROGRESS NOTES
HPI:     Chief Complaint     Warts        HPI     Warts    Additional comments: LOV: 01/11/18. Pt presents for f/u on plantar warts, pt believes she sees improvement. Pt states she is using liquid acid at home.         Last edited by Chanda Carias, [address] [place] [address] on 2 (primary encounter diagnosis)  The area is pared  and treated with cryotherapy. Patient will follow for retreatment 3-4 weeks unless they think it is resolved. A lot of the lesion was lifted up with appearing.   Patient will resume topical treatment    Or

## 2018-04-02 ENCOUNTER — OFFICE VISIT (OUTPATIENT)
Dept: DERMATOLOGY CLINIC | Facility: CLINIC | Age: 33
End: 2018-04-02

## 2018-04-02 DIAGNOSIS — B07.0 PLANTAR WART: Primary | ICD-10-CM

## 2018-04-02 PROCEDURE — 17110 DESTRUCTION B9 LES UP TO 14: CPT | Performed by: DERMATOLOGY

## 2018-04-02 NOTE — PROGRESS NOTES
.  HPI:     Chief Complaint     Warts        HPI     Warts    Additional comments: LOV: 02/27/18. Pt presents with f/u to plantar warts, pt noticed slight improvement.         Last edited by Christelle Baron on 4/2/2018 11:09 AM. (History)          Ade RA       PHYSICAL EXAM:   Patient is alert and oriented and appears their stated age. They are well-nourished. Exam is remarkable for the following-  1.   There is still a significant keratotic lesion on the plantar left foot towards the ball      ASSESSM

## 2018-05-03 ENCOUNTER — OFFICE VISIT (OUTPATIENT)
Dept: DERMATOLOGY CLINIC | Facility: CLINIC | Age: 33
End: 2018-05-03

## 2018-05-03 DIAGNOSIS — B07.0 PLANTAR WART: Primary | ICD-10-CM

## 2018-05-03 PROCEDURE — 17110 DESTRUCTION B9 LES UP TO 14: CPT | Performed by: DERMATOLOGY

## 2018-05-03 NOTE — PROGRESS NOTES
HPI:     Chief Complaint     Warts        HPI     Warts    Additional comments: LOV: 4/2/18. Patient present for 4 week F/U for left plantar wart. Pt states everything has been fine since LOV and been applying OTC liquid wart removal. Denies pain.         L still a significant keratotic more on the plantar left foot. ASSESSMENT/PLAN:   Plantar wart  (primary encounter diagnosis)-The area is pared  and treated with cryotherapy.   Patient will follow for retreatment 3-4 weeks unless they think it is resolve

## 2018-05-26 ENCOUNTER — OFFICE VISIT (OUTPATIENT)
Dept: DERMATOLOGY CLINIC | Facility: CLINIC | Age: 33
End: 2018-05-26

## 2018-05-26 DIAGNOSIS — B07.0 PLANTAR WART: Primary | ICD-10-CM

## 2018-05-26 PROCEDURE — 17110 DESTRUCTION B9 LES UP TO 14: CPT | Performed by: DERMATOLOGY

## 2018-05-26 NOTE — PROGRESS NOTES
HPI:     Chief Complaint     Rash Skin Problem (integumentary)        HPI     Rash Skin Problem (integumentary)    Additional comments: Left foot plantar wart; f/up       Last edited by Yolis Gipson, Christelle Collier on 5/26/2018 10:01 AM. (History)        Patient h Stroke Mother    • Other [OTHER] Other      RA       PHYSICAL EXAM:   Patient is alert and oriented and appears their stated age. They are well-nourished. Exam is remarkable for the following-  1.   There is a keratotic thicker lesion at the ball of the l

## 2018-06-18 ENCOUNTER — OFFICE VISIT (OUTPATIENT)
Dept: INTERNAL MEDICINE CLINIC | Facility: CLINIC | Age: 33
End: 2018-06-18

## 2018-06-18 VITALS
TEMPERATURE: 99 F | BODY MASS INDEX: 20.54 KG/M2 | DIASTOLIC BLOOD PRESSURE: 76 MMHG | WEIGHT: 120.31 LBS | HEIGHT: 64 IN | SYSTOLIC BLOOD PRESSURE: 112 MMHG | HEART RATE: 65 BPM

## 2018-06-18 DIAGNOSIS — H61.22 IMPACTED CERUMEN OF LEFT EAR: Primary | ICD-10-CM

## 2018-06-18 PROCEDURE — 69210 REMOVE IMPACTED EAR WAX UNI: CPT | Performed by: INTERNAL MEDICINE

## 2018-06-18 PROCEDURE — 99212 OFFICE O/P EST SF 10 MIN: CPT | Performed by: INTERNAL MEDICINE

## 2018-06-18 NOTE — PROGRESS NOTES
Jazmine Dutton is a 34year old female. Patient presents with:  Ear Wax      HPI:     HPI  Patient is a 21-year-old female here with the sensation of bilateral ear blockage. No other complaints. No fever, chills, nasal discharge or congestion.   Repor is alert and oriented to person, place, and time. She has normal reflexes. Skin: Skin is warm and dry.          ASSESSMENT AND PLAN:     Problem List Items Addressed This Visit     None      Visit Diagnoses     Impacted cerumen of left ear    -  Primary

## 2018-06-26 ENCOUNTER — OFFICE VISIT (OUTPATIENT)
Dept: DERMATOLOGY CLINIC | Facility: CLINIC | Age: 33
End: 2018-06-26

## 2018-06-26 DIAGNOSIS — B07.0 PLANTAR WART: Primary | ICD-10-CM

## 2018-06-26 PROCEDURE — 17110 DESTRUCTION B9 LES UP TO 14: CPT | Performed by: DERMATOLOGY

## 2018-06-26 NOTE — PROGRESS NOTES
HPI:     Chief Complaint     Warts        HPI     Warts    Additional comments: Pt presents today for 5 week f/u with wart to ball of L foot treated at [address] [place] [address] with cyro and also treating it with OTC tx that pt believes some improv.         Last edited by [facility], ball of the left foot is still present. After paring down, markedly less keratotic. ASSESSMENT/PLAN:   Plantar wart  (primary encounter diagnosis)-improving– The area is pared  and treated with cryotherapy.   Patient will follow for retreatment 3-4 week

## 2018-07-24 ENCOUNTER — OFFICE VISIT (OUTPATIENT)
Dept: DERMATOLOGY CLINIC | Facility: CLINIC | Age: 33
End: 2018-07-24
Payer: COMMERCIAL

## 2018-07-24 DIAGNOSIS — B07.0 PLANTAR WART: Primary | ICD-10-CM

## 2018-07-24 PROCEDURE — 17110 DESTRUCTION B9 LES UP TO 14: CPT | Performed by: DERMATOLOGY

## 2018-07-24 NOTE — PROGRESS NOTES
HPI:     Chief Complaint     Warts        HPI     Warts    Additional comments: LOV: 6-26-18.  Pt presents today for f/u with plantar wart to L foot treated at [facility] with cryo and pt is also treating it at home with salicylic acid and notes the skin is contin well-nourished. Exam is remarkable for the following-  1. Lesion on the foot is still present. A lot of hyperkeratotic debris is loosened.       ASSESSMENT/PLAN:   Plantar wart  (primary encounter diagnosis)-The area is pared  and treated with cryotherap

## 2018-08-18 ENCOUNTER — OFFICE VISIT (OUTPATIENT)
Dept: DERMATOLOGY CLINIC | Facility: CLINIC | Age: 33
End: 2018-08-18
Payer: COMMERCIAL

## 2018-08-18 DIAGNOSIS — B07.0 PLANTAR WART: Primary | ICD-10-CM

## 2018-08-18 PROCEDURE — 17110 DESTRUCTION B9 LES UP TO 14: CPT | Performed by: DERMATOLOGY

## 2018-08-18 NOTE — PROGRESS NOTES
HPI:     Chief Complaint     Warts        HPI     Warts    Additional comments: Julián Rehman 7/24/2018. Pt presenting for 4 week f/u with wart to  L foot. Previously treated at [address] [place] [address] with cryo, pt notes also using salicyic acid.         Last edited by Clair Black hyperkeratosis on the plantar aspect of the left foot by the ball      ASSESSMENT/PLAN:   Plantar wart  (primary encounter diagnosis)-The area is pared  and treated with cryotherapy.   Patient will follow for retreatment 3-4 weeks unless they think it is re

## 2018-09-08 ENCOUNTER — OFFICE VISIT (OUTPATIENT)
Dept: DERMATOLOGY CLINIC | Facility: CLINIC | Age: 33
End: 2018-09-08
Payer: COMMERCIAL

## 2018-09-08 DIAGNOSIS — B07.0 PLANTAR WART: Primary | ICD-10-CM

## 2018-09-08 PROCEDURE — 17110 DESTRUCTION B9 LES UP TO 14: CPT | Performed by: DERMATOLOGY

## 2018-09-08 NOTE — PROGRESS NOTES
HPI:     Chief Complaint     Warts        HPI     Warts      Additional comments: LOV: 8-18-18. Pt presents today for f/u with plantar wart treated with cryo at [facility] and also treating it at home with the salicyic acid that pt believes more improv. Comment: none    Other Topics      Concerns:         Service: Not Asked        Blood Transfusions: Not Asked        Caffeine Concern: Not Asked        Occupational Exposure: Not Asked        Hobby Hazards: Not Asked        Sleep Concern: Not A

## 2018-09-29 ENCOUNTER — OFFICE VISIT (OUTPATIENT)
Dept: DERMATOLOGY CLINIC | Facility: CLINIC | Age: 33
End: 2018-09-29
Payer: COMMERCIAL

## 2018-09-29 DIAGNOSIS — B07.0 PLANTAR WART: Primary | ICD-10-CM

## 2018-09-29 PROCEDURE — 17110 DESTRUCTION B9 LES UP TO 14: CPT | Performed by: DERMATOLOGY

## 2018-09-29 NOTE — PROGRESS NOTES
HPI:     Chief Complaint     Warts        HPI     Warts      Additional comments: established pt, presents for 3 weeks F/U for plantar wart to left foot. \"I think it's better\".  Cryo at last visit plus pt is also using OTC wart removal products Sleep Concern: Not Asked        Stress Concern: Not Asked        Weight Concern: Not Asked        Special Diet: Not Asked        Back Care: Not Asked        Exercise: Not Asked        Bike Helmet: Not Asked        Seat Belt: Yes        Self-Exams: Not Aske

## 2018-10-27 ENCOUNTER — OFFICE VISIT (OUTPATIENT)
Dept: DERMATOLOGY CLINIC | Facility: CLINIC | Age: 33
End: 2018-10-27
Payer: COMMERCIAL

## 2018-10-27 DIAGNOSIS — B07.0 PLANTAR WART: Primary | ICD-10-CM

## 2018-10-27 PROCEDURE — 17110 DESTRUCTION B9 LES UP TO 14: CPT | Performed by: DERMATOLOGY

## 2018-10-27 NOTE — PROGRESS NOTES
HPI:     Chief Complaint     Warts        HPI     [place]      Additional comments: LOV 9/29/18. Patient for f/u of wart to left plantar foot. Cryo done at [address] [place] [address] with improvement. Pt. using at home wart remover.  Wart remains          Last edited by Kylie Figueroa Asked        Weight Concern: Not Asked        Special Diet: Not Asked        Back Care: Not Asked        Exercise: Not Asked        Bike Helmet: Not Asked        Seat Belt: Yes        Self-Exams: Not Asked        Grew up on a farm: No        History of tan

## 2018-10-31 ENCOUNTER — OFFICE VISIT (OUTPATIENT)
Dept: INTERNAL MEDICINE CLINIC | Facility: CLINIC | Age: 33
End: 2018-10-31
Payer: COMMERCIAL

## 2018-10-31 VITALS
HEIGHT: 64 IN | DIASTOLIC BLOOD PRESSURE: 80 MMHG | WEIGHT: 120.5 LBS | BODY MASS INDEX: 20.57 KG/M2 | TEMPERATURE: 98 F | SYSTOLIC BLOOD PRESSURE: 118 MMHG | HEART RATE: 98 BPM

## 2018-10-31 DIAGNOSIS — B30.9 ACUTE VIRAL CONJUNCTIVITIS OF BOTH EYES: Primary | ICD-10-CM

## 2018-10-31 PROCEDURE — 99212 OFFICE O/P EST SF 10 MIN: CPT | Performed by: INTERNAL MEDICINE

## 2018-10-31 PROCEDURE — 99213 OFFICE O/P EST LOW 20 MIN: CPT | Performed by: INTERNAL MEDICINE

## 2018-10-31 RX ORDER — OFLOXACIN 3 MG/ML
2 SOLUTION/ DROPS OPHTHALMIC 4 TIMES DAILY
Qty: 1 BOTTLE | Refills: 0 | Status: SHIPPED | OUTPATIENT
Start: 2018-10-31 | End: 2018-11-05

## 2018-10-31 NOTE — PROGRESS NOTES
Aldo Deal is a 34year old female.   Patient presents with:  Pink Eye: pt c/o of discharge on both eye and itchiness      HPI:     HPI  Patient is a 82-year-old female with no significant past medical history here with complaints of redness and irri Cardiovascular: Negative for chest pain, palpitations and leg swelling. Gastrointestinal: Negative for abdominal pain, blood in stool, constipation, diarrhea and heartburn. Genitourinary: Negative for dysuria and frequency.    Musculoskeletal: [place] 2 drops into both eyes 4 (four) times daily for 5 days. The patient indicates understanding of these issues and agrees to the plan.               Flor Zarco MD  10/31/2018  9:11 AM

## 2018-12-01 ENCOUNTER — OFFICE VISIT (OUTPATIENT)
Dept: DERMATOLOGY CLINIC | Facility: CLINIC | Age: 33
End: 2018-12-01
Payer: COMMERCIAL

## 2018-12-01 DIAGNOSIS — B07.0 PLANTAR WART: Primary | ICD-10-CM

## 2018-12-01 PROCEDURE — 17110 DESTRUCTION B9 LES UP TO 14: CPT | Performed by: DERMATOLOGY

## 2018-12-01 NOTE — PROGRESS NOTES
HPI:     Chief Complaint     Warts        HPI     Warts      Additional comments: LOV 10/27/18. Patient presents for f/u of wart to left plantar foot. Per patient, \"looks like the root is trying to come out. \" Denies pain.            Last edited by Ping Huber, Weight Concern: Not Asked        Special Diet: Not Asked        Back Care: Not Asked        Exercise: Not Asked        Bike Helmet: Not Asked        Seat Belt: Yes        Self-Exams: Not Asked        Grew up on a farm: No        History of tanning: No

## 2018-12-03 ENCOUNTER — NURSE TRIAGE (OUTPATIENT)
Dept: OTHER | Age: 33
End: 2018-12-03

## 2018-12-03 RX ORDER — OFLOXACIN 3 MG/ML
1 SOLUTION/ DROPS OPHTHALMIC 4 TIMES DAILY
Qty: 5 ML | Refills: 0 | Status: SHIPPED | OUTPATIENT
Start: 2018-12-03 | End: 2018-12-08

## 2018-12-03 NOTE — TELEPHONE ENCOUNTER
Rx for ofloxacin eyedrops sent.   Please inform the patient to follow-up with the ophthalmologist if the symptoms fail to improve

## 2018-12-03 NOTE — TELEPHONE ENCOUNTER
Action Requested: Summary for Provider     []  Critical Lab, Recommendations Needed  [] Need Additional Advice  []   FYI    []   Need Orders  [x] Need Medications Sent to Pharmacy  []  Other     SUMMARY: Seble Harden pt stated that she was in to see you on Oct

## 2018-12-13 ENCOUNTER — TELEPHONE (OUTPATIENT)
Dept: OTHER | Age: 33
End: 2018-12-13

## 2018-12-13 NOTE — TELEPHONE ENCOUNTER
Pt has gotten 2 automated calls regarding scheduling a physical.  States that she has had a physical within the past year. Epic records show last physical 11/2017. Advised scheduling now. Pt will call back.

## 2019-01-05 ENCOUNTER — OFFICE VISIT (OUTPATIENT)
Dept: DERMATOLOGY CLINIC | Facility: CLINIC | Age: 34
End: 2019-01-05
Payer: COMMERCIAL

## 2019-01-05 DIAGNOSIS — B07.0 PLANTAR WART: Primary | ICD-10-CM

## 2019-01-05 PROCEDURE — 17110 DESTRUCTION B9 LES UP TO 14: CPT | Performed by: DERMATOLOGY

## 2019-01-05 NOTE — PROGRESS NOTES
HPI:     Chief Complaint     Warts        HPI     Warts      Additional comments: LOV 12/01/18 pt seen for plantar wart left foot, treated with cryo at [address] pt states some improvement [place]          Last edited by Kimberley Dennis LPN on 6/6/9912  8:23 AM. (H Special Diet: Not Asked        Back Care: Not Asked        Exercise: Not Asked        Bike Helmet: Not Asked        Seat Belt: Yes        Self-Exams: Not Asked        Grew up on a farm: No        History of tanning: No        Outdoor occupation: No

## 2019-02-02 ENCOUNTER — OFFICE VISIT (OUTPATIENT)
Dept: DERMATOLOGY CLINIC | Facility: CLINIC | Age: 34
End: 2019-02-02
Payer: COMMERCIAL

## 2019-02-02 DIAGNOSIS — B07.0 PLANTAR WART: Primary | ICD-10-CM

## 2019-02-02 PROCEDURE — 17110 DESTRUCTION B9 LES UP TO 14: CPT | Performed by: DERMATOLOGY

## 2019-02-02 NOTE — PROGRESS NOTES
HPI:     Chief Complaint     Warts        HPI     Warts      Additional comments: LOV 1/5/2019. Patient presents for f/u of wart to left plantar foot. Cryo done at [address] [place] [address] and patient use OTC treatment with some improvement.  Requesting further treatment Exposure: Not Asked        Hobby Hazards: Not Asked        Sleep Concern: Not Asked        Stress Concern: Not Asked        Weight Concern: Not Asked        Special Diet: Not Asked        Back Care: Not Asked        Exercise: Not Asked        Bike Helmet:

## 2019-02-20 ENCOUNTER — OFFICE VISIT (OUTPATIENT)
Dept: OBGYN CLINIC | Facility: CLINIC | Age: 34
End: 2019-02-20
Payer: COMMERCIAL

## 2019-02-20 VITALS
BODY MASS INDEX: 21 KG/M2 | HEART RATE: 70 BPM | DIASTOLIC BLOOD PRESSURE: 81 MMHG | WEIGHT: 120.38 LBS | SYSTOLIC BLOOD PRESSURE: 114 MMHG

## 2019-02-20 DIAGNOSIS — Z12.4 SCREENING FOR MALIGNANT NEOPLASM OF CERVIX: ICD-10-CM

## 2019-02-20 DIAGNOSIS — Z01.419 ENCOUNTER FOR GYNECOLOGICAL EXAMINATION WITHOUT ABNORMAL FINDING: Primary | ICD-10-CM

## 2019-02-20 PROCEDURE — 99395 PREV VISIT EST AGE 18-39: CPT | Performed by: OBSTETRICS & GYNECOLOGY

## 2019-02-20 NOTE — PROGRESS NOTES
HPI:    Patient ID: Aldo Deal is a 30year old female. HPI   with reg menses and no BC. Karrie See will be 4 in . They are also considering adoption of a child. Menses q 28d / 7-9 and heavy days 1-3.      Review of Systems   Constitutiona rash or lesion on the right labia. There is no rash or lesion on the left labia. Uterus is not enlarged and not tender. Cervix exhibits no motion tenderness and no discharge. Right adnexum displays no mass, no tenderness and no fullness.  Left adnexum displ

## 2019-02-21 LAB — HPV I/H RISK 1 DNA SPEC QL NAA+PROBE: NEGATIVE

## 2019-04-06 ENCOUNTER — OFFICE VISIT (OUTPATIENT)
Dept: DERMATOLOGY CLINIC | Facility: CLINIC | Age: 34
End: 2019-04-06
Payer: COMMERCIAL

## 2019-04-06 DIAGNOSIS — B07.0 PLANTAR WART: Primary | ICD-10-CM

## 2019-04-06 PROCEDURE — 17110 DESTRUCTION B9 LES UP TO 14: CPT | Performed by: DERMATOLOGY

## 2019-04-06 NOTE — PROGRESS NOTES
HPI:     Chief Complaint     Warts        HPI     Warts      Additional comments: established pt - presents 2 months F/U for a plantar wart to left foot.  Cryo and OTC liquid wart remover as treatment - pt states it is getting smaller          Last edited b organization: Not on file        Attends meetings of clubs or organizations: Not on file        Relationship status: Not on file      Intimate partner violence:        Fear of current or ex partner: Not on file        Emotionally abused: Not on file visit (from the past 48 hour(s)). Meds This Visit:      Imaging Orders:  None     Referral Orders:  No orders of the defined types were placed in this encounter.         4/6/2019  Kaley Headings

## 2019-05-04 ENCOUNTER — OFFICE VISIT (OUTPATIENT)
Dept: DERMATOLOGY CLINIC | Facility: CLINIC | Age: 34
End: 2019-05-04
Payer: COMMERCIAL

## 2019-05-04 DIAGNOSIS — B07.0 PLANTAR WART: Primary | ICD-10-CM

## 2019-05-04 PROCEDURE — 17110 DESTRUCTION B9 LES UP TO 14: CPT | Performed by: DERMATOLOGY

## 2019-05-04 NOTE — PROGRESS NOTES
HPI:     Chief Complaint     Derm Problem        HPI     Derm Problem      Additional comments: LOV 4/6/2019. Follow up plantar wart to left foot. Treated with cryotherapy last visit. Good requsults. Requesting retreatment.  Pt applying OTC wart remover sol Not on file        Gets together: Not on file        Attends Evangelical service: Not on file        Active member of club or organization: Not on file        Attends meetings of clubs or organizations: Not on file        Relationship status: Not on file it is resolved. I am hoping next time will be the last treatment necessary    Orders Placed This Encounter      dest benign <15      Results From Past 48 Hours:  No results found for this or any previous visit (from the past 48 hour(s)).     Meds This Visi

## 2019-06-01 ENCOUNTER — OFFICE VISIT (OUTPATIENT)
Dept: DERMATOLOGY CLINIC | Facility: CLINIC | Age: 34
End: 2019-06-01
Payer: COMMERCIAL

## 2019-06-01 DIAGNOSIS — B07.0 PLANTAR WART: Primary | ICD-10-CM

## 2019-06-01 PROCEDURE — 17110 DESTRUCTION B9 LES UP TO 14: CPT | Performed by: DERMATOLOGY

## 2019-06-01 NOTE — PROGRESS NOTES
HPI:     Chief Complaint     Derm Problem        HPI     Derm Problem      Additional comments: LOV 5/4/2019- pt in for f/u on plantar warts to left foot, treated with cryotherapy at last visit, with good results          Last edited by Munira Urena, [address] service: Not on file        Active member of club or organization: Not on file        Attends meetings of clubs or organizations: Not on file        Relationship status: Not on file      Intimate partner violence:        Fear of current or ex partner: Not Otherwise we will go to as needed at this point    Orders Placed This Encounter      dest benign <15      Results From Past 48 Hours:  No results found for this or any previous visit (from the past 48 hour(s)).     Meds This Visit:      Imaging Orders:  Non

## 2019-10-26 ENCOUNTER — TELEPHONE (OUTPATIENT)
Dept: INTERNAL MEDICINE CLINIC | Facility: CLINIC | Age: 34
End: 2019-10-26

## 2019-10-26 NOTE — TELEPHONE ENCOUNTER
Patient has a ear ache and states can only come in Wednesday and schedule appointment with   October 30, at 4:30      Please advise

## 2019-10-26 NOTE — TELEPHONE ENCOUNTER
Spoke with patient who states this is a reacurring problem with her outer left ear. States \"it is tolerable\" and denies fever, drainage, or other symptoms. Patient wanting to keep scheduled appointment.   Informed patient to go to UC if symptoms becom

## 2020-10-12 ENCOUNTER — TELEPHONE (OUTPATIENT)
Dept: OBGYN CLINIC | Facility: CLINIC | Age: 35
End: 2020-10-12

## 2020-10-12 NOTE — TELEPHONE ENCOUNTER
Pt asking if she can push jogger stroller since she is trying to conceive. Informed pt she can continue to push jogger stroller. Informed pt she is overdue for annual exam as last was 2/2019. Pt verbalized understanding.

## 2020-11-13 ENCOUNTER — NURSE TRIAGE (OUTPATIENT)
Dept: FAMILY MEDICINE CLINIC | Facility: CLINIC | Age: 35
End: 2020-11-13

## 2020-11-13 ENCOUNTER — VIRTUAL PHONE E/M (OUTPATIENT)
Dept: FAMILY MEDICINE CLINIC | Facility: CLINIC | Age: 35
End: 2020-11-13
Payer: COMMERCIAL

## 2020-11-13 DIAGNOSIS — J39.9 UPPER RESPIRATORY DISEASE: Primary | ICD-10-CM

## 2020-11-13 PROCEDURE — 99213 OFFICE O/P EST LOW 20 MIN: CPT | Performed by: PHYSICIAN ASSISTANT

## 2020-11-13 NOTE — TELEPHONE ENCOUNTER
Action Requested: Summary for Provider     []  Critical Lab, Recommendations Needed  [] Need Additional Advice  []   FYI    []   Need Orders  [] Need Medications Sent to Pharmacy  []  Other     SUMMARY: Patient states for past week she has been having sore

## 2020-11-14 ENCOUNTER — LAB ENCOUNTER (OUTPATIENT)
Dept: LAB | Facility: HOSPITAL | Age: 35
End: 2020-11-14
Attending: PHYSICIAN ASSISTANT
Payer: COMMERCIAL

## 2020-11-14 DIAGNOSIS — Z20.822 EXPOSURE TO COVID-19 VIRUS: Primary | ICD-10-CM

## 2020-11-14 NOTE — PROGRESS NOTES
HPI: HPI    I spoke Amador Fisher by telephone , verified date of birth, and discussed current concerns. Onset/duration of current symptoms: 3 days.     Common COVID-19 symptoms per CDC: CDC Clinical Guidance  • Fever:     Yes []     No [x]       • file      Highest education level: Not on file    Occupational History      Occupation: Teacher    Social Needs      Financial resource strain: Not on file      Food insecurity        Worry: Not on file        Inability: Not on file      Transportation nee Narrative      Not on file      Review of Systems:   Review of Systems   Constitutional: Positive for chills. Negative for activity change, fatigue and fever. HENT: Positive for congestion and sore throat.  Negative for ear discharge, ear pain, postnasal

## 2020-12-02 ENCOUNTER — TELEPHONE (OUTPATIENT)
Dept: OBGYN CLINIC | Facility: CLINIC | Age: 35
End: 2020-12-02

## 2020-12-02 NOTE — TELEPHONE ENCOUNTER
Pt wants to know if she could do a virtual call with NATALIO for her and her . She wants to talk about possibly getting pregnant during covid. Okay for virtual call?

## 2020-12-02 NOTE — TELEPHONE ENCOUNTER
I called and discussed conception with Trino Mcrae in face of current pandemic. Jaqui Alberto is well.

## 2020-12-02 NOTE — TELEPHONE ENCOUNTER
Patient calling wanting to ask questions regarding getting pregnant with outbreak of Covid. States she is a patient of Dr. Kilo Dobbins.

## 2021-03-03 ENCOUNTER — OFFICE VISIT (OUTPATIENT)
Dept: INTERNAL MEDICINE CLINIC | Facility: CLINIC | Age: 36
End: 2021-03-03
Payer: COMMERCIAL

## 2021-03-03 ENCOUNTER — LAB ENCOUNTER (OUTPATIENT)
Dept: LAB | Facility: HOSPITAL | Age: 36
End: 2021-03-03
Attending: INTERNAL MEDICINE
Payer: COMMERCIAL

## 2021-03-03 VITALS
DIASTOLIC BLOOD PRESSURE: 70 MMHG | SYSTOLIC BLOOD PRESSURE: 108 MMHG | HEART RATE: 86 BPM | OXYGEN SATURATION: 99 % | HEIGHT: 64 IN | RESPIRATION RATE: 14 BRPM | BODY MASS INDEX: 19.46 KG/M2 | WEIGHT: 114 LBS

## 2021-03-03 DIAGNOSIS — Z00.00 ADULT GENERAL MEDICAL EXAM: ICD-10-CM

## 2021-03-03 DIAGNOSIS — Z00.00 ADULT GENERAL MEDICAL EXAM: Primary | ICD-10-CM

## 2021-03-03 PROBLEM — B30.9 ACUTE VIRAL CONJUNCTIVITIS OF BOTH EYES: Status: RESOLVED | Noted: 2018-10-31 | Resolved: 2021-03-03

## 2021-03-03 LAB
ALBUMIN SERPL-MCNC: 4.4 G/DL (ref 3.4–5)
ALBUMIN/GLOB SERPL: 1.5 {RATIO} (ref 1–2)
ALP LIVER SERPL-CCNC: 38 U/L
ALT SERPL-CCNC: 21 U/L
ANION GAP SERPL CALC-SCNC: 5 MMOL/L (ref 0–18)
AST SERPL-CCNC: 12 U/L (ref 15–37)
BILIRUB SERPL-MCNC: 1.2 MG/DL (ref 0.1–2)
BUN BLD-MCNC: 15 MG/DL (ref 7–18)
BUN/CREAT SERPL: 17 (ref 10–20)
CALCIUM BLD-MCNC: 9.5 MG/DL (ref 8.5–10.1)
CHLORIDE SERPL-SCNC: 109 MMOL/L (ref 98–112)
CHOLEST SMN-MCNC: 166 MG/DL (ref ?–200)
CO2 SERPL-SCNC: 27 MMOL/L (ref 21–32)
CREAT BLD-MCNC: 0.88 MG/DL
DEPRECATED RDW RBC AUTO: 40.4 FL (ref 35.1–46.3)
ERYTHROCYTE [DISTWIDTH] IN BLOOD BY AUTOMATED COUNT: 12.4 % (ref 11–15)
EST. AVERAGE GLUCOSE BLD GHB EST-MCNC: 94 MG/DL (ref 68–126)
GLOBULIN PLAS-MCNC: 3 G/DL (ref 2.8–4.4)
GLUCOSE BLD-MCNC: 88 MG/DL (ref 70–99)
HBA1C MFR BLD HPLC: 4.9 % (ref ?–5.7)
HCT VFR BLD AUTO: 42.3 %
HDLC SERPL-MCNC: 82 MG/DL (ref 40–59)
HGB BLD-MCNC: 14.2 G/DL
LDLC SERPL CALC-MCNC: 73 MG/DL (ref ?–100)
M PROTEIN MFR SERPL ELPH: 7.4 G/DL (ref 6.4–8.2)
MCH RBC QN AUTO: 30.2 PG (ref 26–34)
MCHC RBC AUTO-ENTMCNC: 33.6 G/DL (ref 31–37)
MCV RBC AUTO: 90 FL
NONHDLC SERPL-MCNC: 84 MG/DL (ref ?–130)
OSMOLALITY SERPL CALC.SUM OF ELEC: 292 MOSM/KG (ref 275–295)
PATIENT FASTING Y/N/NP: YES
PATIENT FASTING Y/N/NP: YES
PLATELET # BLD AUTO: 184 10(3)UL (ref 150–450)
POTASSIUM SERPL-SCNC: 3.9 MMOL/L (ref 3.5–5.1)
RBC # BLD AUTO: 4.7 X10(6)UL
SODIUM SERPL-SCNC: 141 MMOL/L (ref 136–145)
TRIGL SERPL-MCNC: 55 MG/DL (ref 30–149)
TSI SER-ACNC: 1.05 MIU/ML (ref 0.36–3.74)
VLDLC SERPL CALC-MCNC: 11 MG/DL (ref 0–30)
WBC # BLD AUTO: 5.6 X10(3) UL (ref 4–11)

## 2021-03-03 PROCEDURE — 80061 LIPID PANEL: CPT

## 2021-03-03 PROCEDURE — 3008F BODY MASS INDEX DOCD: CPT | Performed by: INTERNAL MEDICINE

## 2021-03-03 PROCEDURE — 85027 COMPLETE CBC AUTOMATED: CPT

## 2021-03-03 PROCEDURE — 3078F DIAST BP <80 MM HG: CPT | Performed by: INTERNAL MEDICINE

## 2021-03-03 PROCEDURE — 36415 COLL VENOUS BLD VENIPUNCTURE: CPT

## 2021-03-03 PROCEDURE — 83036 HEMOGLOBIN GLYCOSYLATED A1C: CPT

## 2021-03-03 PROCEDURE — 99395 PREV VISIT EST AGE 18-39: CPT | Performed by: INTERNAL MEDICINE

## 2021-03-03 PROCEDURE — 84443 ASSAY THYROID STIM HORMONE: CPT

## 2021-03-03 PROCEDURE — 3074F SYST BP LT 130 MM HG: CPT | Performed by: INTERNAL MEDICINE

## 2021-03-03 PROCEDURE — 80053 COMPREHEN METABOLIC PANEL: CPT

## 2021-03-03 NOTE — PROGRESS NOTES
Conner Coronado is a 31year old female. Patient presents with:  Physical    HPI:   62-year-old pleasant lady with no medical history here to establish care and for physical.  She has no complaints.     Past medical history none    Past surgical history no Negative for dysuria, hematuria, flank pain and difficulty urinating. Musculoskeletal: Negative for myalgias. Skin: Negative for color change and wound. Neurological: Negative for seizures, facial asymmetry and speech difficulty.    Psychiatric/Behavi Avoid too much of sweets and carbohydrates. I encourage patient exercise for 30 to 40 minutes 3-4 times a week. Screenings: Pap smear up-to-date. Self breast exam discussed. Vaccines: Tdap up-to-date.   Declined flu vaccine  Labs: Ordered  Discussed abo

## 2021-04-23 ENCOUNTER — TELEPHONE (OUTPATIENT)
Dept: OBGYN CLINIC | Facility: CLINIC | Age: 36
End: 2021-04-23

## 2021-04-23 NOTE — TELEPHONE ENCOUNTER
Pt calling regarding the COVID vaccine and attempting to become pregnant. Pt advised that OB/Gyne office follow MFM and ACOG recommendations when it comes to COVID vaccine with pregnancy and lactating mothers.  Forward COVID vaccine information through [facility]

## 2021-05-24 ENCOUNTER — TELEPHONE (OUTPATIENT)
Dept: OBGYN CLINIC | Facility: CLINIC | Age: 36
End: 2021-05-24

## 2021-05-24 NOTE — TELEPHONE ENCOUNTER
Pt confirms +HPT LMP 4/8, 6w4d. Periods monthly and regular. Delivered with us 2017 June. Pt is taking a PNV. She is aware she will see all 5 docs and first appt is with a nurse. OBN PC appt made.  Pt is due to receive second covid vaccine Friday, asking if

## 2021-05-26 ENCOUNTER — TELEPHONE (OUTPATIENT)
Dept: OBGYN CLINIC | Facility: CLINIC | Age: 36
End: 2021-05-26

## 2021-05-26 NOTE — TELEPHONE ENCOUNTER
Pt reports she is about 6 weeks pregnant and asking if okay to take Tylenol? States she has her 2nd Covid vaccine scheduled in 2 days and wants to know if she can take Tylenol if needed for symptoms.  Advised pt it is okay to take Tylenol regular or extra s

## 2021-05-29 ENCOUNTER — NURSE ONLY (OUTPATIENT)
Dept: OBGYN CLINIC | Facility: CLINIC | Age: 36
End: 2021-05-29
Payer: COMMERCIAL

## 2021-05-29 DIAGNOSIS — Z34.81 ENCOUNTER FOR SUPERVISION OF OTHER NORMAL PREGNANCY IN FIRST TRIMESTER: Primary | ICD-10-CM

## 2021-05-29 RX ORDER — ACETAMINOPHEN 325 MG/1
325 TABLET ORAL EVERY 6 HOURS PRN
COMMUNITY

## 2021-05-29 NOTE — PROGRESS NOTES
Pt had OBN appt today with no complaints. Normal PN labs ordered. Pt advised all labs must be completed and resulted prior to MD appt. Pt accepted new ob appt with NATALIO on 6/21. Pt stated that LMP was around 4/9/21 (within days).  Pt had regular month [facility], [address] [facility], or  background):  MCV less than 80: No   Neural tube defect (Meningomyelocele, Spina bifida, or Anencephaly): No   Congenital heart defect: No   Down syndrome: No   Mino-Sachs ([address], [place]): No   [place]

## 2021-06-02 ENCOUNTER — LAB ENCOUNTER (OUTPATIENT)
Dept: LAB | Facility: HOSPITAL | Age: 36
End: 2021-06-02
Attending: OBSTETRICS & GYNECOLOGY
Payer: COMMERCIAL

## 2021-06-02 DIAGNOSIS — Z34.81 ENCOUNTER FOR SUPERVISION OF OTHER NORMAL PREGNANCY IN FIRST TRIMESTER: ICD-10-CM

## 2021-06-02 PROCEDURE — 86901 BLOOD TYPING SEROLOGIC RH(D): CPT

## 2021-06-02 PROCEDURE — 86900 BLOOD TYPING SEROLOGIC ABO: CPT

## 2021-06-02 PROCEDURE — 87340 HEPATITIS B SURFACE AG IA: CPT

## 2021-06-02 PROCEDURE — 36415 COLL VENOUS BLD VENIPUNCTURE: CPT

## 2021-06-02 PROCEDURE — 84703 CHORIONIC GONADOTROPIN ASSAY: CPT

## 2021-06-02 PROCEDURE — 87086 URINE CULTURE/COLONY COUNT: CPT

## 2021-06-02 PROCEDURE — 85025 COMPLETE CBC W/AUTO DIFF WBC: CPT

## 2021-06-02 PROCEDURE — 86780 TREPONEMA PALLIDUM: CPT

## 2021-06-02 PROCEDURE — 86762 RUBELLA ANTIBODY: CPT

## 2021-06-02 PROCEDURE — 86850 RBC ANTIBODY SCREEN: CPT

## 2021-06-02 PROCEDURE — 87389 HIV-1 AG W/HIV-1&-2 AB AG IA: CPT

## 2021-06-18 ENCOUNTER — HOSPITAL ENCOUNTER (OUTPATIENT)
Dept: ULTRASOUND IMAGING | Facility: HOSPITAL | Age: 36
Discharge: HOME | End: 2021-06-18
Attending: OBSTETRICS & GYNECOLOGY
Payer: COMMERCIAL

## 2021-06-18 ENCOUNTER — TELEPHONE (OUTPATIENT)
Dept: OBGYN CLINIC | Facility: CLINIC | Age: 36
End: 2021-06-18

## 2021-06-18 DIAGNOSIS — O26.859 SPOTTING IN EARLY PREGNANCY: Primary | ICD-10-CM

## 2021-06-18 DIAGNOSIS — O26.859 SPOTTING IN EARLY PREGNANCY: ICD-10-CM

## 2021-06-18 PROCEDURE — 76801 OB US < 14 WKS SINGLE FETUS: CPT | Performed by: OBSTETRICS & GYNECOLOGY

## 2021-06-18 NOTE — TELEPHONE ENCOUNTER
Pt is 10w today. She had her OBN appt on 5/29/21 and has her npn appt on 6/21/21. Pt states last night she noticed some blood in the toilet when she urinated. There was none of the paper with wiping though.   Pt was also experiencing very mild cramping,

## 2021-06-18 NOTE — TELEPHONE ENCOUNTER
Reviewed message with AURELIO over the phone. She wants a hold and call US ordered. Spoke with Elijah Pan in [address]. Hold and call scheduled at 11am in [facility]. Pt to start drinking at 9:45am and finish by 10am. Order placed.   Elijah Pan informed top page AURELIO

## 2021-06-21 ENCOUNTER — INITIAL PRENATAL (OUTPATIENT)
Dept: OBGYN CLINIC | Facility: CLINIC | Age: 36
End: 2021-06-21
Payer: COMMERCIAL

## 2021-06-21 VITALS
BODY MASS INDEX: 20 KG/M2 | WEIGHT: 116 LBS | DIASTOLIC BLOOD PRESSURE: 74 MMHG | HEART RATE: 69 BPM | SYSTOLIC BLOOD PRESSURE: 110 MMHG

## 2021-06-21 DIAGNOSIS — Z34.91 ENCOUNTER FOR SUPERVISION OF NORMAL PREGNANCY IN FIRST TRIMESTER, UNSPECIFIED GRAVIDITY: Primary | ICD-10-CM

## 2021-06-21 PROCEDURE — 81002 URINALYSIS NONAUTO W/O SCOPE: CPT | Performed by: OBSTETRICS & GYNECOLOGY

## 2021-06-21 PROCEDURE — 3074F SYST BP LT 130 MM HG: CPT | Performed by: OBSTETRICS & GYNECOLOGY

## 2021-06-21 PROCEDURE — 3078F DIAST BP <80 MM HG: CPT | Performed by: OBSTETRICS & GYNECOLOGY

## 2021-06-22 NOTE — PROGRESS NOTES
Jacinta Smith at visit. Jacey Kern is well. They decline genetics. Her LMP is within 5-7d so we'll use US EDC. She has daily nausea but improved in past week.

## 2021-07-22 ENCOUNTER — ROUTINE PRENATAL (OUTPATIENT)
Dept: OBGYN CLINIC | Facility: CLINIC | Age: 36
End: 2021-07-22
Payer: COMMERCIAL

## 2021-07-22 VITALS
WEIGHT: 120 LBS | HEART RATE: 76 BPM | SYSTOLIC BLOOD PRESSURE: 112 MMHG | DIASTOLIC BLOOD PRESSURE: 74 MMHG | BODY MASS INDEX: 21 KG/M2

## 2021-07-22 DIAGNOSIS — Z34.92 ENCOUNTER FOR SUPERVISION OF NORMAL PREGNANCY IN SECOND TRIMESTER, UNSPECIFIED GRAVIDITY: Primary | ICD-10-CM

## 2021-07-22 LAB
APPEARANCE: CLEAR
BILIRUBIN: NEGATIVE
GLUCOSE (URINE DIPSTICK): NEGATIVE MG/DL
KETONES (URINE DIPSTICK): NEGATIVE MG/DL
MULTISTIX LOT#: 5077 NUMERIC
NITRITE, URINE: NEGATIVE
OCCULT BLOOD: NEGATIVE
PH, URINE: 7 (ref 4.5–8)
PROTEIN (URINE DIPSTICK): NEGATIVE MG/DL
SPECIFIC GRAVITY: 1 (ref 1–1.03)
URINE-COLOR: YELLOW
UROBILINOGEN,SEMI-QN: 0.2 MG/DL (ref 0–1.9)

## 2021-07-22 PROCEDURE — 81002 URINALYSIS NONAUTO W/O SCOPE: CPT | Performed by: OBSTETRICS & GYNECOLOGY

## 2021-07-22 PROCEDURE — 3078F DIAST BP <80 MM HG: CPT | Performed by: OBSTETRICS & GYNECOLOGY

## 2021-07-22 PROCEDURE — 3074F SYST BP LT 130 MM HG: CPT | Performed by: OBSTETRICS & GYNECOLOGY

## 2021-08-18 ENCOUNTER — ROUTINE PRENATAL (OUTPATIENT)
Dept: OBGYN CLINIC | Facility: CLINIC | Age: 36
End: 2021-08-18
Payer: COMMERCIAL

## 2021-08-18 VITALS
BODY MASS INDEX: 22 KG/M2 | WEIGHT: 126.19 LBS | SYSTOLIC BLOOD PRESSURE: 99 MMHG | HEART RATE: 73 BPM | DIASTOLIC BLOOD PRESSURE: 65 MMHG

## 2021-08-18 DIAGNOSIS — Z34.92 ENCOUNTER FOR SUPERVISION OF NORMAL PREGNANCY IN SECOND TRIMESTER, UNSPECIFIED GRAVIDITY: Primary | ICD-10-CM

## 2021-08-18 LAB
BILIRUBIN: NEGATIVE
GLUCOSE (URINE DIPSTICK): NEGATIVE MG/DL
KETONES (URINE DIPSTICK): NEGATIVE MG/DL
MULTISTIX LOT#: ABNORMAL NUMERIC
NITRITE, URINE: NEGATIVE
OCCULT BLOOD: NEGATIVE
PH, URINE: 7.5 (ref 4.5–8)
PROTEIN (URINE DIPSTICK): NEGATIVE MG/DL
SPECIFIC GRAVITY: 1 (ref 1–1.03)
UROBILINOGEN,SEMI-QN: 0.2 MG/DL (ref 0–1.9)

## 2021-08-18 PROCEDURE — 81002 URINALYSIS NONAUTO W/O SCOPE: CPT | Performed by: OBSTETRICS & GYNECOLOGY

## 2021-08-18 PROCEDURE — 3074F SYST BP LT 130 MM HG: CPT | Performed by: OBSTETRICS & GYNECOLOGY

## 2021-08-18 PROCEDURE — 3078F DIAST BP <80 MM HG: CPT | Performed by: OBSTETRICS & GYNECOLOGY

## 2021-08-24 ENCOUNTER — HOSPITAL ENCOUNTER (OUTPATIENT)
Dept: ULTRASOUND IMAGING | Facility: HOSPITAL | Age: 36
Discharge: HOME | End: 2021-08-24
Attending: OBSTETRICS & GYNECOLOGY
Payer: COMMERCIAL

## 2021-08-24 DIAGNOSIS — Z34.92 ENCOUNTER FOR SUPERVISION OF NORMAL PREGNANCY IN SECOND TRIMESTER, UNSPECIFIED GRAVIDITY: ICD-10-CM

## 2021-08-24 PROCEDURE — 76805 OB US >/= 14 WKS SNGL FETUS: CPT | Performed by: OBSTETRICS & GYNECOLOGY

## 2021-09-14 ENCOUNTER — ROUTINE PRENATAL (OUTPATIENT)
Dept: OBGYN CLINIC | Facility: CLINIC | Age: 36
End: 2021-09-14
Payer: COMMERCIAL

## 2021-09-14 VITALS
SYSTOLIC BLOOD PRESSURE: 99 MMHG | HEART RATE: 78 BPM | DIASTOLIC BLOOD PRESSURE: 64 MMHG | BODY MASS INDEX: 22 KG/M2 | WEIGHT: 129 LBS

## 2021-09-14 DIAGNOSIS — Z34.92 ENCOUNTER FOR SUPERVISION OF NORMAL PREGNANCY IN SECOND TRIMESTER, UNSPECIFIED GRAVIDITY: Primary | ICD-10-CM

## 2021-09-14 LAB
APPEARANCE: CLEAR
BILIRUBIN: NEGATIVE
GLUCOSE (URINE DIPSTICK): NEGATIVE MG/DL
KETONES (URINE DIPSTICK): NEGATIVE MG/DL
MULTISTIX LOT#: 5077 NUMERIC
NITRITE, URINE: NEGATIVE
OCCULT BLOOD: NEGATIVE
PH, URINE: 5.5 (ref 4.5–8)
PROTEIN (URINE DIPSTICK): NEGATIVE MG/DL
SPECIFIC GRAVITY: 1 (ref 1–1.03)
URINE-COLOR: YELLOW
UROBILINOGEN,SEMI-QN: 0.2 MG/DL (ref 0–1.9)

## 2021-09-14 PROCEDURE — 3078F DIAST BP <80 MM HG: CPT | Performed by: OBSTETRICS & GYNECOLOGY

## 2021-09-14 PROCEDURE — 81002 URINALYSIS NONAUTO W/O SCOPE: CPT | Performed by: OBSTETRICS & GYNECOLOGY

## 2021-09-14 PROCEDURE — 3074F SYST BP LT 130 MM HG: CPT | Performed by: OBSTETRICS & GYNECOLOGY

## 2021-09-15 PROBLEM — Z34.90 NORMAL OBSTETRIC ULTRASOUND SCAN, ANTEPARTUM (HCC): Status: ACTIVE | Noted: 2021-09-15

## 2021-09-15 PROBLEM — Z34.90 NORMAL OBSTETRIC ULTRASOUND SCAN, ANTEPARTUM: Status: ACTIVE | Noted: 2021-09-15

## 2021-09-27 ENCOUNTER — TELEPHONE (OUTPATIENT)
Dept: OBGYN CLINIC | Facility: CLINIC | Age: 36
End: 2021-09-27

## 2021-10-07 ENCOUNTER — LAB ENCOUNTER (OUTPATIENT)
Dept: LAB | Facility: HOSPITAL | Age: 36
End: 2021-10-07
Attending: OBSTETRICS & GYNECOLOGY
Payer: COMMERCIAL

## 2021-10-07 DIAGNOSIS — Z34.92 ENCOUNTER FOR SUPERVISION OF NORMAL PREGNANCY IN SECOND TRIMESTER, UNSPECIFIED GRAVIDITY: ICD-10-CM

## 2021-10-07 PROCEDURE — 36415 COLL VENOUS BLD VENIPUNCTURE: CPT

## 2021-10-07 PROCEDURE — 82950 GLUCOSE TEST: CPT

## 2021-10-07 PROCEDURE — 85027 COMPLETE CBC AUTOMATED: CPT

## 2021-10-20 ENCOUNTER — ROUTINE PRENATAL (OUTPATIENT)
Dept: OBGYN CLINIC | Facility: CLINIC | Age: 36
End: 2021-10-20
Payer: COMMERCIAL

## 2021-10-20 VITALS
HEART RATE: 72 BPM | BODY MASS INDEX: 23 KG/M2 | WEIGHT: 135.63 LBS | DIASTOLIC BLOOD PRESSURE: 66 MMHG | SYSTOLIC BLOOD PRESSURE: 105 MMHG

## 2021-10-20 DIAGNOSIS — Z34.82 ENCOUNTER FOR SUPERVISION OF OTHER NORMAL PREGNANCY IN SECOND TRIMESTER: Primary | ICD-10-CM

## 2021-10-20 PROCEDURE — 90471 IMMUNIZATION ADMIN: CPT | Performed by: OBSTETRICS & GYNECOLOGY

## 2021-10-20 PROCEDURE — 81002 URINALYSIS NONAUTO W/O SCOPE: CPT | Performed by: OBSTETRICS & GYNECOLOGY

## 2021-10-20 PROCEDURE — 3078F DIAST BP <80 MM HG: CPT | Performed by: OBSTETRICS & GYNECOLOGY

## 2021-10-20 PROCEDURE — 90686 IIV4 VACC NO PRSV 0.5 ML IM: CPT | Performed by: OBSTETRICS & GYNECOLOGY

## 2021-10-20 PROCEDURE — 90472 IMMUNIZATION ADMIN EACH ADD: CPT | Performed by: OBSTETRICS & GYNECOLOGY

## 2021-10-20 PROCEDURE — 90715 TDAP VACCINE 7 YRS/> IM: CPT | Performed by: OBSTETRICS & GYNECOLOGY

## 2021-10-20 PROCEDURE — 3074F SYST BP LT 130 MM HG: CPT | Performed by: OBSTETRICS & GYNECOLOGY

## 2021-11-02 ENCOUNTER — ROUTINE PRENATAL (OUTPATIENT)
Dept: OBGYN CLINIC | Facility: CLINIC | Age: 36
End: 2021-11-02
Payer: COMMERCIAL

## 2021-11-02 VITALS
BODY MASS INDEX: 23 KG/M2 | WEIGHT: 136 LBS | SYSTOLIC BLOOD PRESSURE: 98 MMHG | HEART RATE: 78 BPM | DIASTOLIC BLOOD PRESSURE: 63 MMHG

## 2021-11-02 DIAGNOSIS — Z34.83 ENCOUNTER FOR SUPERVISION OF OTHER NORMAL PREGNANCY IN THIRD TRIMESTER: Primary | ICD-10-CM

## 2021-11-02 PROCEDURE — 3078F DIAST BP <80 MM HG: CPT | Performed by: OBSTETRICS & GYNECOLOGY

## 2021-11-02 PROCEDURE — 81002 URINALYSIS NONAUTO W/O SCOPE: CPT | Performed by: OBSTETRICS & GYNECOLOGY

## 2021-11-02 PROCEDURE — 3074F SYST BP LT 130 MM HG: CPT | Performed by: OBSTETRICS & GYNECOLOGY

## 2021-11-17 ENCOUNTER — ROUTINE PRENATAL (OUTPATIENT)
Dept: OBGYN CLINIC | Facility: CLINIC | Age: 36
End: 2021-11-17
Payer: COMMERCIAL

## 2021-11-17 ENCOUNTER — HOSPITAL ENCOUNTER (OUTPATIENT)
Dept: ULTRASOUND IMAGING | Facility: HOSPITAL | Age: 36
Discharge: HOME | End: 2021-11-17
Attending: OBSTETRICS & GYNECOLOGY
Payer: COMMERCIAL

## 2021-11-17 VITALS
WEIGHT: 139 LBS | HEART RATE: 85 BPM | SYSTOLIC BLOOD PRESSURE: 96 MMHG | BODY MASS INDEX: 24 KG/M2 | DIASTOLIC BLOOD PRESSURE: 61 MMHG

## 2021-11-17 DIAGNOSIS — Z34.91 ENCOUNTER FOR SUPERVISION OF NORMAL PREGNANCY IN FIRST TRIMESTER, UNSPECIFIED GRAVIDITY: Primary | ICD-10-CM

## 2021-11-17 DIAGNOSIS — O26.843 SIGNIFICANT DISCREPANCY BETWEEN UTERINE SIZE AND CLINICAL DATES, ANTEPARTUM, THIRD TRIMESTER: ICD-10-CM

## 2021-11-17 PROCEDURE — 3078F DIAST BP <80 MM HG: CPT | Performed by: OBSTETRICS & GYNECOLOGY

## 2021-11-17 PROCEDURE — 76816 OB US FOLLOW-UP PER FETUS: CPT | Performed by: OBSTETRICS & GYNECOLOGY

## 2021-11-17 PROCEDURE — 3074F SYST BP LT 130 MM HG: CPT | Performed by: OBSTETRICS & GYNECOLOGY

## 2021-11-17 PROCEDURE — 81002 URINALYSIS NONAUTO W/O SCOPE: CPT | Performed by: OBSTETRICS & GYNECOLOGY

## 2021-11-17 NOTE — PROGRESS NOTES
Girl. Tucker Orellana is well. Occasional times with several B-[facility] UC's with increased activity but resolves with rest/ hydration. We discussed size< dates and US scheduled at 1730 today.

## 2021-12-01 ENCOUNTER — ROUTINE PRENATAL (OUTPATIENT)
Dept: OBGYN CLINIC | Facility: CLINIC | Age: 36
End: 2021-12-01
Payer: COMMERCIAL

## 2021-12-01 VITALS
SYSTOLIC BLOOD PRESSURE: 103 MMHG | DIASTOLIC BLOOD PRESSURE: 65 MMHG | BODY MASS INDEX: 24 KG/M2 | HEART RATE: 76 BPM | WEIGHT: 141 LBS

## 2021-12-01 DIAGNOSIS — Z34.83 ENCOUNTER FOR SUPERVISION OF OTHER NORMAL PREGNANCY IN THIRD TRIMESTER: Primary | ICD-10-CM

## 2021-12-01 PROCEDURE — 3074F SYST BP LT 130 MM HG: CPT | Performed by: OBSTETRICS & GYNECOLOGY

## 2021-12-01 PROCEDURE — 3078F DIAST BP <80 MM HG: CPT | Performed by: OBSTETRICS & GYNECOLOGY

## 2021-12-01 PROCEDURE — 81002 URINALYSIS NONAUTO W/O SCOPE: CPT | Performed by: OBSTETRICS & GYNECOLOGY

## 2021-12-14 ENCOUNTER — LAB ENCOUNTER (OUTPATIENT)
Dept: LAB | Facility: HOSPITAL | Age: 36
End: 2021-12-14
Attending: OBSTETRICS & GYNECOLOGY
Payer: COMMERCIAL

## 2021-12-14 DIAGNOSIS — Z34.83 ENCOUNTER FOR SUPERVISION OF OTHER NORMAL PREGNANCY IN THIRD TRIMESTER: ICD-10-CM

## 2021-12-14 PROCEDURE — 86780 TREPONEMA PALLIDUM: CPT

## 2021-12-14 PROCEDURE — 87389 HIV-1 AG W/HIV-1&-2 AB AG IA: CPT

## 2021-12-14 PROCEDURE — 85027 COMPLETE CBC AUTOMATED: CPT

## 2021-12-14 PROCEDURE — 36415 COLL VENOUS BLD VENIPUNCTURE: CPT

## 2021-12-16 ENCOUNTER — TELEPHONE (OUTPATIENT)
Dept: OBGYN CLINIC | Facility: CLINIC | Age: 36
End: 2021-12-16

## 2021-12-16 ENCOUNTER — ROUTINE PRENATAL (OUTPATIENT)
Dept: OBGYN CLINIC | Facility: CLINIC | Age: 36
End: 2021-12-16
Payer: COMMERCIAL

## 2021-12-16 VITALS
DIASTOLIC BLOOD PRESSURE: 67 MMHG | WEIGHT: 143 LBS | HEART RATE: 90 BPM | BODY MASS INDEX: 25 KG/M2 | SYSTOLIC BLOOD PRESSURE: 101 MMHG

## 2021-12-16 DIAGNOSIS — Z34.83 ENCOUNTER FOR SUPERVISION OF OTHER NORMAL PREGNANCY IN THIRD TRIMESTER: Primary | ICD-10-CM

## 2021-12-16 PROCEDURE — 3078F DIAST BP <80 MM HG: CPT | Performed by: OBSTETRICS & GYNECOLOGY

## 2021-12-16 PROCEDURE — 3074F SYST BP LT 130 MM HG: CPT | Performed by: OBSTETRICS & GYNECOLOGY

## 2021-12-16 PROCEDURE — 81002 URINALYSIS NONAUTO W/O SCOPE: CPT | Performed by: OBSTETRICS & GYNECOLOGY

## 2021-12-16 NOTE — TELEPHONE ENCOUNTER
MARGARET instructed patient make appointment for 1/17 for her 41 week appointment. Patient would like to stick with [facility] location but there are currently no openings that day.  Thank you

## 2021-12-17 NOTE — TELEPHONE ENCOUNTER
[facility]A- can we offer patient a 1/17/22  NP slot to work in for 41w? No other availability with other providers that day.

## 2021-12-17 NOTE — TELEPHONE ENCOUNTER
NO those are for my gyne patients.   We should have pt's delivering by then and slot should be opening

## 2021-12-17 NOTE — TELEPHONE ENCOUNTER
Pt has appt on 1/13, can we offer pt appt on 1/18 when she is 41w1d? To Vivian Bernardo, Supervisor to review and advise. Thank you.

## 2021-12-21 ENCOUNTER — ROUTINE PRENATAL (OUTPATIENT)
Dept: OBGYN CLINIC | Facility: CLINIC | Age: 36
End: 2021-12-21
Payer: COMMERCIAL

## 2021-12-21 VITALS
WEIGHT: 143 LBS | DIASTOLIC BLOOD PRESSURE: 67 MMHG | HEART RATE: 83 BPM | BODY MASS INDEX: 25 KG/M2 | SYSTOLIC BLOOD PRESSURE: 98 MMHG

## 2021-12-21 DIAGNOSIS — Z34.83 ENCOUNTER FOR SUPERVISION OF OTHER NORMAL PREGNANCY IN THIRD TRIMESTER: Primary | ICD-10-CM

## 2021-12-21 PROCEDURE — 3074F SYST BP LT 130 MM HG: CPT | Performed by: OBSTETRICS & GYNECOLOGY

## 2021-12-21 PROCEDURE — 3078F DIAST BP <80 MM HG: CPT | Performed by: OBSTETRICS & GYNECOLOGY

## 2021-12-21 PROCEDURE — 81002 URINALYSIS NONAUTO W/O SCOPE: CPT | Performed by: OBSTETRICS & GYNECOLOGY

## 2021-12-29 ENCOUNTER — ROUTINE PRENATAL (OUTPATIENT)
Dept: OBGYN CLINIC | Facility: CLINIC | Age: 36
End: 2021-12-29
Payer: COMMERCIAL

## 2021-12-29 VITALS
WEIGHT: 143.63 LBS | BODY MASS INDEX: 25 KG/M2 | HEART RATE: 92 BPM | SYSTOLIC BLOOD PRESSURE: 127 MMHG | DIASTOLIC BLOOD PRESSURE: 73 MMHG

## 2021-12-29 DIAGNOSIS — Z34.83 ENCOUNTER FOR SUPERVISION OF OTHER NORMAL PREGNANCY IN THIRD TRIMESTER: Primary | ICD-10-CM

## 2021-12-29 PROCEDURE — 3078F DIAST BP <80 MM HG: CPT | Performed by: OBSTETRICS & GYNECOLOGY

## 2021-12-29 PROCEDURE — 3074F SYST BP LT 130 MM HG: CPT | Performed by: OBSTETRICS & GYNECOLOGY

## 2021-12-29 PROCEDURE — 81002 URINALYSIS NONAUTO W/O SCOPE: CPT | Performed by: OBSTETRICS & GYNECOLOGY

## 2021-12-29 NOTE — TELEPHONE ENCOUNTER
Emergency Department    [address] [place] [id number]    Phone:  [phone]    Fax:  [phone]                                       Pamella Lynn   MRN: [id number]    Department:   Emergency Department   Date of Visit:  2/10/2018           Patient Information     Date Of Birth          1972        Your diagnoses for this visit were:     Strain of neck muscle, initial encounter        You were seen by Gonzalez Osullivan MD.      Follow-up Information     Follow up with your doctor.        Discharge Instructions         Neck Sprain or Strain  A sudden force that causes turning or bending of the neck can cause sprain or strain. An example would be the force from a car accident. This can stretch or tear muscles called a strain. It can also stretch or tear ligaments called a sprain. Either of these can cause neck pain. Sometimes neck pain occurs after a simple awkward movement. In either case, muscle spasm is commonly present and contributes to the pain.     Unless you had a forceful physical injury (for example, a car accident or fall), X-rays are usually not ordered for the initial evaluation of neck pain. If pain continues and dose not respond to medical treatment, X-rays and other tests may be performed at a later time.  Home care    You may feel more soreness and spasm the first few days after the injury. Rest until symptoms begin to improve.    When lying down, use a comfortable pillow or a rolled towel that supports the head and keeps the spine in a neutral position. The position of the head should not be tilted forward or backward.    Apply an ice pack over the injured area for 15 to 20 minutes every 3 to 6 hours. You should do this for the first 24 to 48 hours. You can make an ice pack by filling a plastic bag that seals at the top with ice cubes and then wrapping it with a thin towel. After 48 hours, apply heat (warm shower or warm bath) for 15 to 20 minutes several times a  Appt with [facility]A on 1/17 at 11:30 ( overbook slot) Please call pt day, or alternate ice and heat.    You may use over-the-counter pain medicine to control pain, unless another pain medicine was prescribed. If you have chronic liver or kidney disease or ever had a stomach ulcer or GI bleeding, talk with your healthcare provider before using these medicines.    If a soft cervical collar was prescribed, it should be worn only for periods of increased pain. It should not be worn for more than 3 hours a day, or for a period longer than 1 to 2 weeks.  Follow-up care  Follow up with your healthcare provider as directed. Physical therapy may be needed.  Sometimes fractures don t show up on the first X-ray. Bruises and sprains can sometimes hurt as much as a fracture. These injuries can take time to heal completely. If your symptoms don t improve or they get worse, talk with your healthcare provider. You may need a repeat X-ray or other tests. If X-rays were taken, you will be told of any new findings that may affect your care.  Call 911  Call 911 if you have:    Neck swelling, difficulty or painful swallowing    Difficulty breathing    Chest pain  When to seek medical advice  Call your healthcare provider right away if any of these occur:    Pain becomes worse or spreads into your arms    Weakness or numbness in one or both arms  Date Last Reviewed: 11/19/2015 2000-2017 [employer]. [address] [place] [address], [place] [address]. All rights reserved. This information is not intended as a substitute for professional medical care. Always follow your healthcare professional's instructions.          24 Hour Appointment Hotline       To make an appointment at any [facility], call [phone] ([phone]). If you don't have a family doctor or clinic, we will help you find one. [facility] clinics are conveniently located to serve the needs of you and your family.             Review of your medicines      START taking        Dose / Directions Last dose taken    tiZANidine 4  MG tablet   Commonly known as:  ZANAFLEX   Dose:  4 mg   Quantity:  20 tablet        Take 1 tablet (4 mg) by mouth 3 times daily as needed for muscle spasms   Refills:  0                Prescriptions were sent or printed at these locations (1 Prescription)                   Other Prescriptions                Printed at Department/Unit printer (1 of 1)         tiZANidine (ZANAFLEX) 4 MG tablet                Procedures and tests performed during your visit     Cervical spine CT w/o contrast      Orders Needing Specimen Collection     None      Pending Results     Date and Time Order Name Status Description    2/10/2018 1226 Cervical spine CT w/o contrast Preliminary             Pending Culture Results     No orders found from 2/8/2018 to 2/11/2018.            Pending Results Instructions     If you had any lab results that were not finalized at the time of your Discharge, you can call the ED Lab Result RN at [phone]. You will be contacted by this team for any positive Lab results or changes in treatment. The nurses are available 7 days a week from 10A to 6:30P.  You can leave a message 24 hours per day and they will return your call.        Test Results From Your Hospital Stay        2/10/2018 12:56 PM      Narrative     CT CERVICAL SPINE WITHOUT CONTRAST   2/10/2018 12:51 PM     HISTORY: Midline pain, hyperextension.     TECHNIQUE: Axial images of the cervical spine were obtained without  intravenous contrast. Multiplanar reformations were performed.   Radiation dose for this scan was reduced using automated exposure  control, adjustment of the mA and/or kV according to patient size, or  iterative reconstruction technique.    COMPARISON: None.    FINDINGS: There is no evidence of fracture.     Alignment: Normal.      Craniocervical junction: Normal.     C1-C2:  Normal.     C2-C3:  Normal disc, facet joints, spinal canal and neural foramina.     C3-C4:  Normal disc, facet joints, spinal canal and neural  foramina.     C4-C5:  Normal disc, facet joints, spinal canal and neural foramina.     C5-C6:  Normal disc, facet joints, spinal canal and neural foramina.   Mild anterior hypertrophy at this level.    C6-C7:  Normal disc, facet joints, spinal canal and neural foramina.      C7-T1:   Normal disc, facet joints, spinal canal and neural foramina.        Impression     IMPRESSION:    1. No fracture or acute abnormality. Minimal degenerative change.                 Clinical Quality Measure: Blood Pressure Screening     Your blood pressure was checked while you were in the emergency department today. The last reading we obtained was  BP: (!) 127/95 . Please read the guidelines below about what these numbers mean and what you should do about them.  If your systolic blood pressure (the top number) is less than 120 and your diastolic blood pressure (the bottom number) is less than 80, then your blood pressure is normal. There is nothing more that you need to do about it.  If your systolic blood pressure (the top number) is 120-139 or your diastolic blood pressure (the bottom number) is 80-89, your blood pressure may be higher than it should be. You should have your blood pressure rechecked within a year by a primary care provider.  If your systolic blood pressure (the top number) is 140 or greater or your diastolic blood pressure (the bottom number) is 90 or greater, you may have high blood pressure. High blood pressure is treatable, but if left untreated over time it can put you at risk for heart attack, stroke, or kidney failure. You should have your blood pressure rechecked by a primary care provider within the next 4 weeks.  If your provider in the emergency department today gave you specific instructions to follow-up with your doctor or provider even sooner than that, you should follow that instruction and not wait for up to 4 weeks for your follow-up visit.        Thank you for choosing [facility]       Thank you for  "choosing [facility] for your care. Our goal is always to provide you with excellent care. Hearing back from our patients is one way we can continue to improve our services. Please take a few minutes to complete the written survey that you may receive in the mail after you visit with us. Thank you!        [facility] Information     [facility] lets you send messages to your doctor, view your test results, renew your prescriptions, schedule appointments and more. To sign up, go to [email or web address] . Click on \"Log in\" on the left side of the screen, which will take you to the Welcome page. Then click on \"Sign up Now\" on the right side of the page.     You will be asked to enter the access code listed below, as well as some personal information. Please follow the directions to create your username and password.     Your access code is: [email or web address]  Expires: 2018  1:06 PM     Your access code will  in 90 days. If you need help or a new code, please call your [facility] clinic or [phone].        Care EveryWhere ID     This is your Care EveryWhere ID. This could be used by other organizations to access your [facility] medical records  [id number]        Equal Access to Services     ELIAN SAHU : Gee Dennis, jason montgomery, aramis gates, sukhjinder pineda. So [facility] [phone].    ATENCIÓN: Si habla español, tiene a serna disposición servicios gratuitos de asistencia lingüística. Llame al [phone].    We comply with applicable federal civil rights laws and [place] laws. We do not discriminate on the basis of race, color, national origin, age, disability, sex, sexual orientation, or gender identity.            After Visit Summary       This is your record. Keep this with you and show to your community pharmacist(s) and doctor(s) at your next visit.                  "

## 2021-12-29 NOTE — PROGRESS NOTES
FH< dates as seen in prior visits. EFW normal in November. HANNA today = 3.9 cm, 2.3, 1.0, 3.0 = 10.2 cm.

## 2022-01-05 ENCOUNTER — ROUTINE PRENATAL (OUTPATIENT)
Dept: OBGYN CLINIC | Facility: CLINIC | Age: 37
End: 2022-01-05
Payer: COMMERCIAL

## 2022-01-05 VITALS
SYSTOLIC BLOOD PRESSURE: 104 MMHG | DIASTOLIC BLOOD PRESSURE: 69 MMHG | HEART RATE: 90 BPM | BODY MASS INDEX: 25 KG/M2 | WEIGHT: 145 LBS

## 2022-01-05 DIAGNOSIS — Z34.91 ENCOUNTER FOR SUPERVISION OF NORMAL PREGNANCY IN FIRST TRIMESTER, UNSPECIFIED GRAVIDITY: Primary | ICD-10-CM

## 2022-01-05 LAB
APPEARANCE: CLEAR
BILIRUBIN: NEGATIVE
GLUCOSE (URINE DIPSTICK): NEGATIVE MG/DL
MULTISTIX LOT#: 1027 NUMERIC
NITRITE, URINE: NEGATIVE
OCCULT BLOOD: NEGATIVE
PH, URINE: 7 (ref 4.5–8)
PROTEIN (URINE DIPSTICK): NEGATIVE MG/DL
SPECIFIC GRAVITY: 1.01 (ref 1–1.03)
URINE-COLOR: YELLOW
UROBILINOGEN,SEMI-QN: 0.2 MG/DL (ref 0–1.9)

## 2022-01-05 PROCEDURE — 81002 URINALYSIS NONAUTO W/O SCOPE: CPT | Performed by: OBSTETRICS & GYNECOLOGY

## 2022-01-05 PROCEDURE — 3078F DIAST BP <80 MM HG: CPT | Performed by: OBSTETRICS & GYNECOLOGY

## 2022-01-05 PROCEDURE — 3074F SYST BP LT 130 MM HG: CPT | Performed by: OBSTETRICS & GYNECOLOGY

## 2022-01-13 ENCOUNTER — ROUTINE PRENATAL (OUTPATIENT)
Dept: OBGYN CLINIC | Facility: CLINIC | Age: 37
End: 2022-01-13
Payer: COMMERCIAL

## 2022-01-13 VITALS
SYSTOLIC BLOOD PRESSURE: 105 MMHG | BODY MASS INDEX: 25 KG/M2 | DIASTOLIC BLOOD PRESSURE: 69 MMHG | WEIGHT: 145 LBS | HEART RATE: 85 BPM

## 2022-01-13 DIAGNOSIS — Z34.83 ENCOUNTER FOR SUPERVISION OF OTHER NORMAL PREGNANCY IN THIRD TRIMESTER: Primary | ICD-10-CM

## 2022-01-13 LAB
APPEARANCE: CLEAR
BILIRUBIN: NEGATIVE
GLUCOSE (URINE DIPSTICK): NEGATIVE MG/DL
KETONES (URINE DIPSTICK): NEGATIVE MG/DL
MULTISTIX LOT#: ABNORMAL NUMERIC
NITRITE, URINE: NEGATIVE
OCCULT BLOOD: NEGATIVE
PH, URINE: 6.5 (ref 4.5–8)
PROTEIN (URINE DIPSTICK): NEGATIVE MG/DL
SPECIFIC GRAVITY: 1.02 (ref 1–1.03)
URINE-COLOR: YELLOW
UROBILINOGEN,SEMI-QN: 0.2 MG/DL (ref 0–1.9)

## 2022-01-13 PROCEDURE — 3078F DIAST BP <80 MM HG: CPT | Performed by: OBSTETRICS & GYNECOLOGY

## 2022-01-13 PROCEDURE — 81002 URINALYSIS NONAUTO W/O SCOPE: CPT | Performed by: OBSTETRICS & GYNECOLOGY

## 2022-01-13 PROCEDURE — 3074F SYST BP LT 130 MM HG: CPT | Performed by: OBSTETRICS & GYNECOLOGY

## 2022-01-16 ENCOUNTER — ANESTHESIA (OUTPATIENT)
Dept: OBGYN UNIT | Facility: HOSPITAL | Age: 37
End: 2022-01-16
Payer: COMMERCIAL

## 2022-01-16 ENCOUNTER — HOSPITAL ENCOUNTER (INPATIENT)
Facility: HOSPITAL | Age: 37
LOS: 1 days | Discharge: HOME | End: 2022-01-17
Attending: OBSTETRICS & GYNECOLOGY | Admitting: OBSTETRICS & GYNECOLOGY
Payer: COMMERCIAL

## 2022-01-16 ENCOUNTER — ANESTHESIA EVENT (OUTPATIENT)
Dept: OBGYN UNIT | Facility: HOSPITAL | Age: 37
End: 2022-01-16
Payer: COMMERCIAL

## 2022-01-16 PROBLEM — Z34.90 PREGNANCY (HCC): Status: ACTIVE | Noted: 2022-01-16

## 2022-01-16 PROBLEM — Z34.90 PREGNANCY: Status: ACTIVE | Noted: 2022-01-16

## 2022-01-16 LAB
ANTIBODY SCREEN: NEGATIVE
BASOPHILS # BLD AUTO: 0.02 X10(3) UL (ref 0–0.2)
BASOPHILS NFR BLD AUTO: 0.2 %
DEPRECATED RDW RBC AUTO: 48.9 FL (ref 35.1–46.3)
EOSINOPHIL # BLD AUTO: 0.03 X10(3) UL (ref 0–0.7)
EOSINOPHIL NFR BLD AUTO: 0.2 %
ERYTHROCYTE [DISTWIDTH] IN BLOOD BY AUTOMATED COUNT: 13.6 % (ref 11–15)
HCT VFR BLD AUTO: 39.3 %
HGB BLD-MCNC: 13.2 G/DL
IMM GRANULOCYTES # BLD AUTO: 0.06 X10(3) UL (ref 0–1)
IMM GRANULOCYTES NFR BLD: 0.5 %
LYMPHOCYTES # BLD AUTO: 2.03 X10(3) UL (ref 1–4)
LYMPHOCYTES NFR BLD AUTO: 15.7 %
MCH RBC QN AUTO: 32.7 PG (ref 26–34)
MCHC RBC AUTO-ENTMCNC: 33.6 G/DL (ref 31–37)
MCV RBC AUTO: 97.3 FL
MONOCYTES # BLD AUTO: 0.65 X10(3) UL (ref 0.1–1)
MONOCYTES NFR BLD AUTO: 5 %
NEUTROPHILS # BLD AUTO: 10.1 X10 (3) UL (ref 1.5–7.7)
NEUTROPHILS # BLD AUTO: 10.1 X10(3) UL (ref 1.5–7.7)
NEUTROPHILS NFR BLD AUTO: 78.4 %
PLATELET # BLD AUTO: 145 10(3)UL (ref 150–450)
RBC # BLD AUTO: 4.04 X10(6)UL
RH BLOOD TYPE: POSITIVE
SARS-COV-2 RNA RESP QL NAA+PROBE: NOT DETECTED
WBC # BLD AUTO: 12.9 X10(3) UL (ref 4–11)

## 2022-01-16 PROCEDURE — 0HQ9XZZ REPAIR PERINEUM SKIN, EXTERNAL APPROACH: ICD-10-PCS | Performed by: OBSTETRICS & GYNECOLOGY

## 2022-01-16 PROCEDURE — 59400 OBSTETRICAL CARE: CPT | Performed by: OBSTETRICS & GYNECOLOGY

## 2022-01-16 RX ORDER — BUPIVACAINE HCL/0.9 % NACL/PF 0.25 %
5 PLASTIC BAG, INJECTION (ML) EPIDURAL AS NEEDED
Status: DISCONTINUED | OUTPATIENT
Start: 2022-01-16 | End: 2022-01-16

## 2022-01-16 RX ORDER — BISACODYL 10 MG
10 SUPPOSITORY, RECTAL RECTAL ONCE AS NEEDED
Status: DISCONTINUED | OUTPATIENT
Start: 2022-01-16 | End: 2022-01-17

## 2022-01-16 RX ORDER — LIDOCAINE HYDROCHLORIDE 10 MG/ML
INJECTION, SOLUTION INFILTRATION; PERINEURAL
Status: COMPLETED | OUTPATIENT
Start: 2022-01-16 | End: 2022-01-16

## 2022-01-16 RX ORDER — ONDANSETRON 2 MG/ML
4 INJECTION INTRAMUSCULAR; INTRAVENOUS EVERY 6 HOURS PRN
Status: DISCONTINUED | OUTPATIENT
Start: 2022-01-16 | End: 2022-01-17

## 2022-01-16 RX ORDER — DIAPER,BRIEF,INFANT-TODD,DISP
1 EACH MISCELLANEOUS EVERY 6 HOURS PRN
Status: DISCONTINUED | OUTPATIENT
Start: 2022-01-16 | End: 2022-01-17

## 2022-01-16 RX ORDER — LIDOCAINE HYDROCHLORIDE AND EPINEPHRINE 15; 5 MG/ML; UG/ML
INJECTION, SOLUTION EPIDURAL
Status: COMPLETED | OUTPATIENT
Start: 2022-01-16 | End: 2022-01-16

## 2022-01-16 RX ORDER — ONDANSETRON 2 MG/ML
4 INJECTION INTRAMUSCULAR; INTRAVENOUS EVERY 6 HOURS PRN
Status: DISCONTINUED | OUTPATIENT
Start: 2022-01-16 | End: 2022-01-16

## 2022-01-16 RX ORDER — LIDOCAINE HYDROCHLORIDE AND EPINEPHRINE 20; 5 MG/ML; UG/ML
INJECTION, SOLUTION EPIDURAL; INFILTRATION; INTRACAUDAL; PERINEURAL
Status: DISPENSED
Start: 2022-01-16 | End: 2022-01-16

## 2022-01-16 RX ORDER — DOCUSATE SODIUM 100 MG/1
100 CAPSULE, LIQUID FILLED ORAL
Status: DISCONTINUED | OUTPATIENT
Start: 2022-01-16 | End: 2022-01-17

## 2022-01-16 RX ORDER — IBUPROFEN 600 MG/1
600 TABLET ORAL EVERY 6 HOURS
Status: DISCONTINUED | OUTPATIENT
Start: 2022-01-16 | End: 2022-01-17

## 2022-01-16 RX ORDER — AMMONIA INHALANTS 0.04 G/.3ML
0.3 INHALANT RESPIRATORY (INHALATION) AS NEEDED
Status: DISCONTINUED | OUTPATIENT
Start: 2022-01-16 | End: 2022-01-17

## 2022-01-16 RX ORDER — TERBUTALINE SULFATE 1 MG/ML
0.25 INJECTION, SOLUTION SUBCUTANEOUS AS NEEDED
Status: DISCONTINUED | OUTPATIENT
Start: 2022-01-16 | End: 2022-01-16

## 2022-01-16 RX ORDER — AMMONIA INHALANTS 0.04 G/.3ML
0.3 INHALANT RESPIRATORY (INHALATION) AS NEEDED
Status: DISCONTINUED | OUTPATIENT
Start: 2022-01-16 | End: 2022-01-16

## 2022-01-16 RX ORDER — BUPIVACAINE HYDROCHLORIDE 2.5 MG/ML
INJECTION, SOLUTION EPIDURAL; INFILTRATION; INTRACAUDAL
Status: COMPLETED | OUTPATIENT
Start: 2022-01-16 | End: 2022-01-16

## 2022-01-16 RX ORDER — ACETAMINOPHEN 500 MG
500 TABLET ORAL EVERY 6 HOURS PRN
Status: DISCONTINUED | OUTPATIENT
Start: 2022-01-16 | End: 2022-01-16

## 2022-01-16 RX ORDER — NALBUPHINE HCL 10 MG/ML
2.5 AMPUL (ML) INJECTION
Status: DISCONTINUED | OUTPATIENT
Start: 2022-01-16 | End: 2022-01-16

## 2022-01-16 RX ORDER — ACETAMINOPHEN 325 MG/1
650 TABLET ORAL EVERY 6 HOURS PRN
Status: DISCONTINUED | OUTPATIENT
Start: 2022-01-16 | End: 2022-01-17

## 2022-01-16 RX ORDER — SODIUM CHLORIDE, SODIUM LACTATE, POTASSIUM CHLORIDE, CALCIUM CHLORIDE 600; 310; 30; 20 MG/100ML; MG/100ML; MG/100ML; MG/100ML
INJECTION, SOLUTION INTRAVENOUS CONTINUOUS
Status: DISCONTINUED | OUTPATIENT
Start: 2022-01-16 | End: 2022-01-16

## 2022-01-16 RX ORDER — IBUPROFEN 600 MG/1
600 TABLET ORAL EVERY 6 HOURS PRN
Status: DISCONTINUED | OUTPATIENT
Start: 2022-01-16 | End: 2022-01-16

## 2022-01-16 RX ORDER — TRISODIUM CITRATE DIHYDRATE AND CITRIC ACID MONOHYDRATE 500; 334 MG/5ML; MG/5ML
30 SOLUTION ORAL AS NEEDED
Status: DISCONTINUED | OUTPATIENT
Start: 2022-01-16 | End: 2022-01-16

## 2022-01-16 RX ORDER — BUPIVACAINE HYDROCHLORIDE 2.5 MG/ML
20 INJECTION, SOLUTION EPIDURAL; INFILTRATION; INTRACAUDAL ONCE
Status: DISCONTINUED | OUTPATIENT
Start: 2022-01-16 | End: 2022-01-16

## 2022-01-16 RX ORDER — SIMETHICONE 80 MG
80 TABLET,CHEWABLE ORAL 3 TIMES DAILY PRN
Status: DISCONTINUED | OUTPATIENT
Start: 2022-01-16 | End: 2022-01-17

## 2022-01-16 RX ORDER — LIDOCAINE HYDROCHLORIDE 10 MG/ML
30 INJECTION, SOLUTION EPIDURAL; INFILTRATION; INTRACAUDAL; PERINEURAL ONCE
Status: DISCONTINUED | OUTPATIENT
Start: 2022-01-16 | End: 2022-01-16

## 2022-01-16 RX ORDER — DEXTROSE, SODIUM CHLORIDE, SODIUM LACTATE, POTASSIUM CHLORIDE, AND CALCIUM CHLORIDE 5; .6; .31; .03; .02 G/100ML; G/100ML; G/100ML; G/100ML; G/100ML
INJECTION, SOLUTION INTRAVENOUS AS NEEDED
Status: DISCONTINUED | OUTPATIENT
Start: 2022-01-16 | End: 2022-01-16

## 2022-01-16 RX ADMIN — LIDOCAINE HYDROCHLORIDE 5 ML: 10 INJECTION, SOLUTION INFILTRATION; PERINEURAL at 07:44:00

## 2022-01-16 RX ADMIN — LIDOCAINE HYDROCHLORIDE AND EPINEPHRINE 5 ML: 15; 5 INJECTION, SOLUTION EPIDURAL at 07:44:00

## 2022-01-16 RX ADMIN — BUPIVACAINE HYDROCHLORIDE 5 ML: 2.5 INJECTION, SOLUTION EPIDURAL; INFILTRATION; INTRACAUDAL at 07:44:00

## 2022-01-16 NOTE — PROGRESS NOTES
Patient up to bathroom with Javier Marr assist.  Voided 100ml. Patient transferred to mother/baby room 360 per wheelchair in stable condition with baby and personal belongings. Accompanied by significant other and staff.   Report given to mother/baby RN, T

## 2022-01-16 NOTE — LACTATION NOTE
This note was copied from a baby's chart.   LACTATION NOTE - INFANT         Problems & Assessment  Problems Diagnosed or Identified: Latch difficulty  Infant Assessment: Skin color: pink or appropriate for ethnicity  Muscle tone: Appropriate for GA    [facility]

## 2022-01-16 NOTE — LACTATION NOTE
LACTATION NOTE - MOTHER           Problems identified  Problems identified: Knowledge deficit         Breastfeeding goal  Breastfeeding goal: To maintain breast milk feeding per patient goal    Maternal Assessment  Bilateral Breasts: Symmetrical;Soft  Bila

## 2022-01-16 NOTE — L&D DELIVERY NOTE
[facility]OSP - [facility]    Vaginal Delivery Note    Mahsa Montesinos Patient Status:  Inpatient    1985 MRN [id number]   Location [facility] Attending Joseph Steve MD   Hosp Day # 0 PCP MD Fuad Groves

## 2022-01-16 NOTE — H&P
[id number] Patient Status:  Inpatient    1985 MRN [id number]   Location [facility] Attending Ethan Kaur MD   Hosp Day # 0 PCP Raymond Lanza MD     Date of Mother    • Other (Other) Other         RA     Social History: Social History    Tobacco Use      Smoking status: Never Smoker      Smokeless tobacco: Never Used    Alcohol use: No      Alcohol/week: 0.0 standard drinks      Home Meds: acetaminophen 325 MG 15.7 %    Monocyte % 5.0 %    Eosinophil % 0.2 %    Basophil % 0.2 %    Immature Granulocyte % 0.5 %          ASSESSMENT:    1.  Labor      PLAN:    1. AROM. Pitocin augmentation if needed. 2. GBS neg  3.  FHTs reassuring         Tanner Rowe MD  1/16/2022

## 2022-01-16 NOTE — LACTATION NOTE
LACTATION NOTE - MOTHER      Evaluation Type: Inpatient              Breastfeeding goal  Breastfeeding goal: To maintain breast milk feeding per patient goal    Maternal Assessment  Bilateral Breasts: Symmetrical;Soft  Bilateral Nipples:  WNL  Prior breastf

## 2022-01-16 NOTE — LACTATION NOTE
This note was copied from a baby's chart.   LACTATION NOTE - INFANT         Problems & Assessment  Problems Diagnosed or Identified: Latch difficulty  Infant Assessment: Skin color: pink or appropriate for ethnicity  Muscle tone: Appropriate for GA    [facility]

## 2022-01-16 NOTE — DISCHARGE SUMMARY
[facility]    Discharge Summary    Kendra Irwin Patient Status:  Inpatient    1985 MRN [id number]   Location [address]  Attending Daniel Nelson MD   [facility] Day # 0       Admission Date:  2022

## 2022-01-16 NOTE — ANESTHESIA PROCEDURE NOTES
Labor Analgesia  Performed by: Isidro Porter MD  Authorized by: Isidro Porter MD       General Information and Staff    Start Time:  1/16/2022 7:44 AM  End Time:  1/16/2022 7:44 AM  Anesthesiologist:  Isidro Porter MD  Performed by:   Anesthe

## 2022-01-16 NOTE — ANESTHESIA PREPROCEDURE EVALUATION
Anesthesia PreOp Note    HPI:     Earle Bain is a 31year old female who presents for preoperative consultation requested by: * No surgeons listed *    Date of Surgery: 1/16/2022    * No procedures listed *  Indication: * No pre-op diagnosis ent Shala Huntley MD  ondansetron [facility]) injection 4 mg, 4 mg, Intravenous, Q6H PRN, Shala Huntley MD  ammonia aromatic (ammonia) nasal solution 0.3 mL, 0.3 mL, Nasal, PRN, Shala Huntley MD  lidocaine PF (XYLOCAINE) 1% injection, 30 mL, Intradermal, Once, Jane Maria, Jul Asked        Back Care: Not Asked        Exercise: Not Asked        Bike Helmet: Not Asked        Seat Belt: Yes        Self-Exams: Not Asked        Grew up on a farm: No        History of tanning: No        Outdoor occupation: No        Pt has a pacemaker complications, and any alternative forms of anesthetic management. All of the patient's questions were answered to the best of my ability. The patient desires the anesthetic management as planned.   Silas Boyd MD  1/16/2022 7:43 AM

## 2022-01-16 NOTE — PROGRESS NOTES
Pt is a 31year old female admitted to [address] [place] [address]. Patient presents with:  R/o Labor: denies LOF, reports +FM, some spotting since yesterday AM     Pt is  40w6d intra-uterine pregnancy. History obtained, consents signed.  Oriented to room, staff,

## 2022-01-16 NOTE — ANESTHESIA POSTPROCEDURE EVALUATION
Patient: Jamar Moore    Procedure Summary     Date: 01/16/22 Room / Location:     Anesthesia Start: 5349 Anesthesia Stop: 5121    Procedure: LABOR ANALGESIA Diagnosis:     Scheduled Providers:  Anesthesiologist: MD Gerson Rodriguez

## 2022-01-17 VITALS
TEMPERATURE: 98 F | SYSTOLIC BLOOD PRESSURE: 104 MMHG | HEART RATE: 85 BPM | OXYGEN SATURATION: 100 % | RESPIRATION RATE: 16 BRPM | DIASTOLIC BLOOD PRESSURE: 61 MMHG

## 2022-01-17 PROBLEM — Z34.90 PREGNANCY (HCC): Status: RESOLVED | Noted: 2022-01-16 | Resolved: 2022-01-17

## 2022-01-17 PROBLEM — Z34.90 PREGNANCY: Status: RESOLVED | Noted: 2022-01-16 | Resolved: 2022-01-17

## 2022-01-17 LAB
BASOPHILS # BLD AUTO: 0.03 X10(3) UL (ref 0–0.2)
BASOPHILS NFR BLD AUTO: 0.2 %
DEPRECATED RDW RBC AUTO: 49.2 FL (ref 35.1–46.3)
EOSINOPHIL # BLD AUTO: 0.02 X10(3) UL (ref 0–0.7)
EOSINOPHIL NFR BLD AUTO: 0.1 %
ERYTHROCYTE [DISTWIDTH] IN BLOOD BY AUTOMATED COUNT: 13.6 % (ref 11–15)
HCT VFR BLD AUTO: 39.5 %
HGB BLD-MCNC: 13.6 G/DL
IMM GRANULOCYTES # BLD AUTO: 0.07 X10(3) UL (ref 0–1)
IMM GRANULOCYTES NFR BLD: 0.4 %
LYMPHOCYTES # BLD AUTO: 1.98 X10(3) UL (ref 1–4)
LYMPHOCYTES NFR BLD AUTO: 11.4 %
MCH RBC QN AUTO: 33.8 PG (ref 26–34)
MCHC RBC AUTO-ENTMCNC: 34.4 G/DL (ref 31–37)
MCV RBC AUTO: 98.3 FL
MONOCYTES # BLD AUTO: 0.91 X10(3) UL (ref 0.1–1)
MONOCYTES NFR BLD AUTO: 5.3 %
NEUTROPHILS # BLD AUTO: 14.32 X10 (3) UL (ref 1.5–7.7)
NEUTROPHILS # BLD AUTO: 14.32 X10(3) UL (ref 1.5–7.7)
NEUTROPHILS NFR BLD AUTO: 82.6 %
PLATELET # BLD AUTO: 186 10(3)UL (ref 150–450)
RBC # BLD AUTO: 4.02 X10(6)UL
WBC # BLD AUTO: 17.3 X10(3) UL (ref 4–11)

## 2022-01-17 RX ORDER — IBUPROFEN 600 MG/1
600 TABLET ORAL EVERY 6 HOURS PRN
Qty: 30 TABLET | Refills: 0 | Status: SHIPPED | OUTPATIENT
Start: 2022-01-17

## 2022-01-17 NOTE — DISCHARGE PLANNING
Discharge order received. Instructions, verbal and written, given to patient with verbalized understanding. Discharged to the car with infant, accompanied by Arlyn LYLES and .

## 2022-01-17 NOTE — PLAN OF CARE
Problem: POSTPARTUM  Goal: Long Term Goal:Experiences normal postpartum course  Description: INTERVENTIONS:  - Assess and monitor vital signs and lab values. - Assess fundus and lochia. - Provide ice/sitz baths for perineum discomfort.   - Monitor heali for signs of nipple pain/trauma. - Instruct and provide assistance with proper latch. - Review techniques for milk expression (breast pumping) and storage of breast milk. Provide pumping equipment/supplies, instructions and assistance, as needed.   Ludmila Celaya

## 2022-01-17 NOTE — PROGRESS NOTES
[facility]    Post  Progress Note      Gamaliel Isabel Patient Status:  Inpatient    1985 MRN [id number]   Location [facility] 3SE Attending Keanu Jolley MD   Hosp Day # 1 PCP Tena Antonio MD       Subjective

## 2022-01-17 NOTE — PLAN OF CARE
Problem: Patient Centered Care  Goal: Patient preferences are identified and integrated in the patient's plan of care  Description: Interventions:  - What would you like us to know as we care for you?   - Provide timely, complete, and accurate informatio information as needed about early infant feeding cues (e.g., rooting, lip smacking, sucking fingers/hand) versus late cue of crying.  - Discuss/demonstrate breast feeding aids (e.g., infant sling, nursing footstool/pillows, and breast pumps).   - Encourage Progressing

## 2022-02-08 ENCOUNTER — TELEPHONE (OUTPATIENT)
Dept: OBGYN UNIT | Facility: HOSPITAL | Age: 37
End: 2022-02-08

## 2022-02-19 ENCOUNTER — TELEPHONE (OUTPATIENT)
Dept: OBGYN CLINIC | Facility: CLINIC | Age: 37
End: 2022-02-19

## 2022-02-19 NOTE — TELEPHONE ENCOUNTER
Pt calling to report she delivered on 1/16 and states she started experiencing vaginal bleeding on 2/17. Pt stated that about a week prior to 2/17 she had one day of vaginal bleeding as well. Pt stated she is wearing overnight pads and changing them every couple hours, mostly for hygiene purposes. Pt stated that there is a moderate amount of bright red blood on pad when she changes it. Pt reports some clots but states they are \"very tiny\". Pt reports that she feels like its a period and reports cramping and achy legs like she gets when she is on a period. Pt is breastfeeding every 2-3 hours. Pt stated she has also been doing more household tasks recently. Pt informed that when breastfeeding, the uterus contracts and may cause cramping and bleeding. Pt stated she hasn't really paid attention if the bleeding is worse during or after feedings, but states she does have more cramping with breastfeeding. Pt instructed to monitor bleeding and go to ER if saturating through pads in an hour or less or experiences severe pelvic pain/cramping. Pt advised she may take 600mg ibuprofen every 6 hours as well. Pt informed message will be sent to MD on call [facility] DR LAM BOSE) for further recs.

## 2022-02-19 NOTE — TELEPHONE ENCOUNTER
Patient states she gave birth about a month ago and is having some bleeding and wants to speak to a nurse about it.

## 2022-02-28 ENCOUNTER — POSTPARTUM (OUTPATIENT)
Dept: OBGYN CLINIC | Facility: CLINIC | Age: 37
End: 2022-02-28
Payer: COMMERCIAL

## 2022-02-28 VITALS
BODY MASS INDEX: 22 KG/M2 | SYSTOLIC BLOOD PRESSURE: 122 MMHG | DIASTOLIC BLOOD PRESSURE: 79 MMHG | HEART RATE: 76 BPM | WEIGHT: 129.81 LBS

## 2022-02-28 PROCEDURE — 3074F SYST BP LT 130 MM HG: CPT | Performed by: OBSTETRICS & GYNECOLOGY

## 2022-02-28 PROCEDURE — 3078F DIAST BP <80 MM HG: CPT | Performed by: OBSTETRICS & GYNECOLOGY

## 2022-03-09 ENCOUNTER — TELEPHONE (OUTPATIENT)
Dept: OBGYN CLINIC | Facility: CLINIC | Age: 37
End: 2022-03-09

## 2022-03-09 NOTE — TELEPHONE ENCOUNTER
Pt informed of jLKs recs and verbalized understanding. Pt advised to call if bleeding lasts for over 14 days. Pt verbalized understanding.

## 2022-03-09 NOTE — TELEPHONE ENCOUNTER
Pt delivered on 1/16 with MARGARET. Pt had PP appt with MARGARET on 2/28. Pt stated she had vaginal bleeding from 2/17-2/28 and then it stopped. Pt stated that after her PP appt, she started working out again 3/1. Pt stated that her bleeding started again this past Sunday, 3/5. Pt stated that the bleeding is more then spotting but less then a period flow. Pt stated that the blood is an \"obvious red\" color and she is wearing pads for sanity purposes. Pt stated she has minor cramping. Pt denies recent IC. Pt is breastfeeding. Explained to pt that she may be experiencing a period, or due to recent exercise changes the bleeding may be from that as well. Pt advised to monitor and message will be sent to Raya Dumont [phone] for further recs. Pt verbalized understanding.

## 2022-11-23 ENCOUNTER — OFFICE VISIT (OUTPATIENT)
Dept: INTERNAL MEDICINE CLINIC | Facility: CLINIC | Age: 37
End: 2022-11-23
Payer: COMMERCIAL

## 2022-11-23 ENCOUNTER — LAB ENCOUNTER (OUTPATIENT)
Dept: LAB | Age: 37
End: 2022-11-23
Attending: INTERNAL MEDICINE
Payer: COMMERCIAL

## 2022-11-23 VITALS
SYSTOLIC BLOOD PRESSURE: 102 MMHG | OXYGEN SATURATION: 98 % | WEIGHT: 116 LBS | DIASTOLIC BLOOD PRESSURE: 68 MMHG | HEART RATE: 76 BPM | BODY MASS INDEX: 19.81 KG/M2 | RESPIRATION RATE: 14 BRPM | HEIGHT: 64 IN

## 2022-11-23 DIAGNOSIS — Z00.00 ADULT GENERAL MEDICAL EXAM: ICD-10-CM

## 2022-11-23 DIAGNOSIS — Z00.00 ADULT GENERAL MEDICAL EXAM: Primary | ICD-10-CM

## 2022-11-23 DIAGNOSIS — Z23 INFLUENZA VACCINE NEEDED: ICD-10-CM

## 2022-11-23 LAB
ALBUMIN SERPL-MCNC: 4 G/DL (ref 3.4–5)
ALBUMIN/GLOB SERPL: 1.4 {RATIO} (ref 1–2)
ALP LIVER SERPL-CCNC: 50 U/L
ALT SERPL-CCNC: 22 U/L
ANION GAP SERPL CALC-SCNC: 3 MMOL/L (ref 0–18)
AST SERPL-CCNC: 14 U/L (ref 15–37)
BILIRUB SERPL-MCNC: 1 MG/DL (ref 0.1–2)
BUN BLD-MCNC: 15 MG/DL (ref 7–18)
BUN/CREAT SERPL: 20 (ref 10–20)
CALCIUM BLD-MCNC: 9 MG/DL (ref 8.5–10.1)
CHLORIDE SERPL-SCNC: 110 MMOL/L (ref 98–112)
CHOLEST SERPL-MCNC: 154 MG/DL (ref ?–200)
CO2 SERPL-SCNC: 26 MMOL/L (ref 21–32)
CREAT BLD-MCNC: 0.75 MG/DL
DEPRECATED RDW RBC AUTO: 41.1 FL (ref 35.1–46.3)
ERYTHROCYTE [DISTWIDTH] IN BLOOD BY AUTOMATED COUNT: 12.3 % (ref 11–15)
EST. AVERAGE GLUCOSE BLD GHB EST-MCNC: 88 MG/DL (ref 68–126)
FASTING PATIENT LIPID ANSWER: YES
FASTING STATUS PATIENT QL REPORTED: YES
GFR SERPLBLD BASED ON 1.73 SQ M-ARVRAT: 106 ML/MIN/1.73M2 (ref 60–?)
GLOBULIN PLAS-MCNC: 2.9 G/DL (ref 2.8–4.4)
GLUCOSE BLD-MCNC: 79 MG/DL (ref 70–99)
HBA1C MFR BLD: 4.7 % (ref ?–5.7)
HCT VFR BLD AUTO: 40.4 %
HDLC SERPL-MCNC: 78 MG/DL (ref 40–59)
HGB BLD-MCNC: 13.6 G/DL
LDLC SERPL CALC-MCNC: 67 MG/DL (ref ?–100)
MCH RBC QN AUTO: 30.8 PG (ref 26–34)
MCHC RBC AUTO-ENTMCNC: 33.7 G/DL (ref 31–37)
MCV RBC AUTO: 91.6 FL
NONHDLC SERPL-MCNC: 76 MG/DL (ref ?–130)
OSMOLALITY SERPL CALC.SUM OF ELEC: 288 MOSM/KG (ref 275–295)
PLATELET # BLD AUTO: 188 10(3)UL (ref 150–450)
POTASSIUM SERPL-SCNC: 3.9 MMOL/L (ref 3.5–5.1)
PROT SERPL-MCNC: 6.9 G/DL (ref 6.4–8.2)
RBC # BLD AUTO: 4.41 X10(6)UL
SODIUM SERPL-SCNC: 139 MMOL/L (ref 136–145)
TRIGL SERPL-MCNC: 37 MG/DL (ref 30–149)
TSI SER-ACNC: 0.88 MIU/ML (ref 0.36–3.74)
VLDLC SERPL CALC-MCNC: 6 MG/DL (ref 0–30)
WBC # BLD AUTO: 5.9 X10(3) UL (ref 4–11)

## 2022-11-23 PROCEDURE — 99395 PREV VISIT EST AGE 18-39: CPT | Performed by: INTERNAL MEDICINE

## 2022-11-23 PROCEDURE — 83036 HEMOGLOBIN GLYCOSYLATED A1C: CPT

## 2022-11-23 PROCEDURE — 3074F SYST BP LT 130 MM HG: CPT | Performed by: INTERNAL MEDICINE

## 2022-11-23 PROCEDURE — 80061 LIPID PANEL: CPT

## 2022-11-23 PROCEDURE — 3078F DIAST BP <80 MM HG: CPT | Performed by: INTERNAL MEDICINE

## 2022-11-23 PROCEDURE — 90686 IIV4 VACC NO PRSV 0.5 ML IM: CPT | Performed by: INTERNAL MEDICINE

## 2022-11-23 PROCEDURE — 80053 COMPREHEN METABOLIC PANEL: CPT

## 2022-11-23 PROCEDURE — 90471 IMMUNIZATION ADMIN: CPT | Performed by: INTERNAL MEDICINE

## 2022-11-23 PROCEDURE — 36415 COLL VENOUS BLD VENIPUNCTURE: CPT

## 2022-11-23 PROCEDURE — 3008F BODY MASS INDEX DOCD: CPT | Performed by: INTERNAL MEDICINE

## 2022-11-23 PROCEDURE — 85027 COMPLETE CBC AUTOMATED: CPT

## 2022-11-23 PROCEDURE — 84443 ASSAY THYROID STIM HORMONE: CPT

## 2024-02-10 ENCOUNTER — OFFICE VISIT (OUTPATIENT)
Dept: INTERNAL MEDICINE CLINIC | Facility: CLINIC | Age: 39
End: 2024-02-10

## 2024-02-10 VITALS
SYSTOLIC BLOOD PRESSURE: 112 MMHG | DIASTOLIC BLOOD PRESSURE: 74 MMHG | HEIGHT: 64 IN | OXYGEN SATURATION: 98 % | WEIGHT: 114 LBS | BODY MASS INDEX: 19.46 KG/M2 | HEART RATE: 67 BPM

## 2024-02-10 DIAGNOSIS — J01.90 ACUTE RHINOSINUSITIS: Primary | ICD-10-CM

## 2024-02-10 PROCEDURE — 99214 OFFICE O/P EST MOD 30 MIN: CPT

## 2024-02-10 RX ORDER — AMOXICILLIN 875 MG/1
875 TABLET, COATED ORAL 2 TIMES DAILY
Qty: 20 TABLET | Refills: 0 | Status: SHIPPED | OUTPATIENT
Start: 2024-02-10 | End: 2024-02-20

## 2024-02-10 RX ORDER — BENZONATATE 100 MG/1
100 CAPSULE ORAL 2 TIMES DAILY PRN
Qty: 20 CAPSULE | Refills: 0 | Status: SHIPPED | OUTPATIENT
Start: 2024-02-10

## 2024-02-10 RX ORDER — FLUTICASONE PROPIONATE 50 MCG
2 SPRAY, SUSPENSION (ML) NASAL DAILY
Qty: 15.8 ML | Refills: 0 | Status: SHIPPED | OUTPATIENT
Start: 2024-02-10 | End: 2025-02-04

## 2024-02-10 NOTE — PROGRESS NOTES
Subjective:   Soraya Cates is a 38 year old female who presents for Cough (Cough for three weeks and runny nose)     C/c has been sick for three weeks. Started on 1/17 with a sudden pain in her throat. Then developed thick green phlegm from her nose and coughing up the same. Symptoms had improved but worsened again this past week. Cough is uncontrollable especially at night causes her to nearly vomit. The cough can be dry or with phlegm. Took cough syrup but not working. Still has sore throat off and on. Congested and rhinorrhea sometimes clear to green/yellow and coughing same phlegm.  No fever or chills recently - had chills at the beginning of the illness     Tested at home for COVID which was negative   Her daughter and  were also sick   Daughter had a cough for a month and a double ear infection and received amoxicillin    History/Other:    Chief Complaint Reviewed and Verified  Nursing Notes Reviewed and   Verified  Tobacco Reviewed  Allergies Reviewed  Medications Reviewed    Problem List Reviewed  Medical History Reviewed  Surgical History   Reviewed  OB Status Reviewed  Family History Reviewed         Tobacco:  She has never smoked tobacco.    Current Outpatient Medications   Medication Sig Dispense Refill    amoxicillin 875 MG Oral Tab Take 1 tablet (875 mg total) by mouth 2 (two) times daily for 10 days. 20 tablet 0    fluticasone propionate 50 MCG/ACT Nasal Suspension 2 sprays by Each Nare route daily. 15.8 mL 0    benzonatate 100 MG Oral Cap Take 1 capsule (100 mg total) by mouth 2 (two) times daily as needed. 20 capsule 0    PRENATAL 27-0.8 MG Oral Tab Take 1 tablet by mouth daily. (Patient not taking: Reported on 2/10/2024)       Review of Systems:  Review of Systems   Constitutional:  Positive for chills. Negative for fever.   HENT:  Positive for congestion, postnasal drip, sneezing and sore throat.    Respiratory:  Positive for cough. Negative for shortness of breath.     Cardiovascular: Negative.    Gastrointestinal: Negative.    Skin: Negative.    Neurological: Negative.      Objective:   /74   Pulse 67   Ht 5' 4\" (1.626 m)   Wt 114 lb (51.7 kg)   LMP 02/09/2024 (Exact Date)   SpO2 98%   BMI 19.57 kg/m²  Estimated body mass index is 19.57 kg/m² as calculated from the following:    Height as of this encounter: 5' 4\" (1.626 m).    Weight as of this encounter: 114 lb (51.7 kg).  Physical Exam  Vitals reviewed.   Constitutional:       General: She is not in acute distress.     Appearance: Normal appearance. She is well-developed.   HENT:      Right Ear: Tympanic membrane normal.      Left Ear: Tympanic membrane normal.      Nose: Congestion present.      Mouth/Throat:      Mouth: Mucous membranes are moist.      Pharynx: Posterior oropharyngeal erythema present.   Cardiovascular:      Rate and Rhythm: Normal rate and regular rhythm.      Heart sounds: Normal heart sounds.   Pulmonary:      Effort: Pulmonary effort is normal.      Breath sounds: Normal breath sounds.   Skin:     General: Skin is warm and dry.   Neurological:      Mental Status: She is alert and oriented to person, place, and time.       Assessment & Plan:   1. Acute rhinosinusitis (Primary)  -     Amoxicillin; Take 1 tablet (875 mg total) by mouth 2 (two) times daily for 10 days.  Dispense: 20 tablet; Refill: 0  -     Fluticasone Propionate; 2 sprays by Each Nare route daily.  Dispense: 15.8 mL; Refill: 0  -     Benzonatate; Take 1 capsule (100 mg total) by mouth 2 (two) times daily as needed.  Dispense: 20 capsule; Refill: 0      JESS Hernandez, 2/10/2024, 9:22 AM

## 2024-03-08 DIAGNOSIS — J01.90 ACUTE RHINOSINUSITIS: ICD-10-CM

## 2024-03-08 RX ORDER — FLUTICASONE PROPIONATE 50 MCG
2 SPRAY, SUSPENSION (ML) NASAL DAILY
Qty: 16 ML | Refills: 0 | OUTPATIENT
Start: 2024-03-08

## 2024-03-15 ENCOUNTER — TELEPHONE (OUTPATIENT)
Dept: INTERNAL MEDICINE CLINIC | Facility: CLINIC | Age: 39
End: 2024-03-15

## 2024-03-15 ENCOUNTER — TELEMEDICINE (OUTPATIENT)
Dept: INTERNAL MEDICINE CLINIC | Facility: CLINIC | Age: 39
End: 2024-03-15
Payer: COMMERCIAL

## 2024-03-15 DIAGNOSIS — J01.90 ACUTE NON-RECURRENT SINUSITIS, UNSPECIFIED LOCATION: Primary | ICD-10-CM

## 2024-03-15 DIAGNOSIS — J01.90 ACUTE NON-RECURRENT SINUSITIS, UNSPECIFIED LOCATION: ICD-10-CM

## 2024-03-15 PROCEDURE — 99213 OFFICE O/P EST LOW 20 MIN: CPT | Performed by: INTERNAL MEDICINE

## 2024-03-15 RX ORDER — AMOXICILLIN AND CLAVULANATE POTASSIUM 875; 125 MG/1; MG/1
1 TABLET, FILM COATED ORAL 2 TIMES DAILY
Qty: 20 TABLET | Refills: 0 | Status: SHIPPED | OUTPATIENT
Start: 2024-03-15 | End: 2024-03-15

## 2024-03-15 RX ORDER — BENZONATATE 100 MG/1
100 CAPSULE ORAL 2 TIMES DAILY PRN
Qty: 20 CAPSULE | Refills: 0 | Status: SHIPPED | OUTPATIENT
Start: 2024-03-15

## 2024-03-15 RX ORDER — AMOXICILLIN AND CLAVULANATE POTASSIUM 875; 125 MG/1; MG/1
1 TABLET, FILM COATED ORAL 2 TIMES DAILY
Qty: 20 TABLET | Refills: 0 | Status: SHIPPED | OUTPATIENT
Start: 2024-03-15 | End: 2024-03-25

## 2024-03-15 RX ORDER — BENZONATATE 100 MG/1
100 CAPSULE ORAL 2 TIMES DAILY PRN
Qty: 20 CAPSULE | Refills: 0 | Status: SHIPPED | OUTPATIENT
Start: 2024-03-15 | End: 2024-03-15

## 2024-03-15 NOTE — TELEPHONE ENCOUNTER
Pt asking Dr Aguilar to send today's scripts  Amoxicillin and cough suppressant  to:    [facility]/PHARMACY #[id number] - [place], [place] - [address] [phone], [phone] [[id number]]     Cancel [facility] pharmacy      Leave pt a voice message when completed [phone] ok to leave a voice message

## 2024-03-15 NOTE — PROGRESS NOTES
Soraya Cates is a 39 year old female.No chief complaint on file.    HPI:   The visit was conducted with the use of interactive audio and video telecommunication system that permits real time communication between the patient and provider. Patient consent for virtual visit obtained, The patient was made aware of the limitations of the telehealth visit, including treatment limitations as no physical exam could be performed.  The patient was advised to call 911 or to go to the ER in case there was an emergency.patient understands and wishes to proceed        Patient presented today for sinus congestion. She states that she has been having sinus congestion, headaches, ear ache, runny  nose with thick mucus since last one week. Cough is now getting worst. Had similar symptoms las tmonth which improved with antibiotics. No fever.     Current Outpatient Medications   Medication Sig Dispense Refill    amoxicillin clavulanate 875-125 MG Oral Tab Take 1 tablet by mouth 2 (two) times daily for 10 days. 20 tablet 0    benzonatate 100 MG Oral Cap Take 1 capsule (100 mg total) by mouth 2 (two) times daily as needed. 20 capsule 0    fluticasone propionate 50 MCG/ACT Nasal Suspension 2 sprays by Each Nare route daily. 15.8 mL 0    PRENATAL 27-0.8 MG Oral Tab Take 1 tablet by mouth daily. (Patient not taking: Reported on 2/10/2024)        Past Medical History:   Diagnosis Date    Visual impairment       Past Surgical History:   Procedure Laterality Date    COLONOSCOPY              Social History:  Social History     Socioeconomic History    Marital status:    Occupational History    Occupation: Teacher   Tobacco Use    Smoking status: Never    Smokeless tobacco: Never   Substance and Sexual Activity    Alcohol use: No     Alcohol/week: 0.0 standard drinks of alcohol    Drug use: No    Sexual activity: Yes     Partners: Male     Comment: none   Other Topics Concern    Seat Belt Yes    Grew up on a farm No     History of tanning No    Outdoor occupation No    Pt has a pacemaker No    Pt has a defibrillator No    Breast feeding No    Reaction to local anesthetic No      Family History   Problem Relation Age of Onset    Uterine Cancer Maternal Grandmother     Heart Disease Maternal Grandfather         CHF age 70s    Other (Liver Cancer) Paternal Grandmother     Other (RA) Paternal Grandmother     Stroke Mother     Other (Other) Other         RA      Allergies   Allergen Reactions    Z-Larry [Zithromax] PAIN        REVIEW OF SYSTEMS:   Review of Systems   Review of Systems   Constitutional: Negative for activity change, appetite change and fever.   HENT: Negative for congestion and voice change.    Respiratory: Negative for cough and shortness of breath.    Cardiovascular: Negative for chest pain.   Gastrointestinal: Negative for abdominal distention, abdominal pain and vomiting.   Genitourinary: Negative for hematuria.   Skin: Negative for wound.   Psychiatric/Behavioral: Negative for behavioral problems.   Wt Readings from Last 5 Encounters:   02/10/24 114 lb (51.7 kg)   11/23/22 116 lb (52.6 kg)   02/28/22 129 lb 12.8 oz (58.9 kg)   01/13/22 145 lb (65.8 kg)   01/05/22 145 lb (65.8 kg)     There is no height or weight on file to calculate BMI.      EXAM:   LMP 02/09/2024 (Exact Date)      Limited exam secondary to telehealth visit  Patient appears well  Alert and oriented  No cough during exam   Normal bilateral upper ext movements  No tremor noted  Mood stable       ASSESSMENT AND PLAN:   1. Acute non-recurrent sinusitis, unspecified location  - tylenol as needed  - lfonase daily  - increase hydration and rest   - amoxicillin clavulanate 875-125 MG Oral Tab; Take 1 tablet by mouth 2 (two) times daily for 10 days.  Dispense: 20 tablet; Refill: 0  - benzonatate 100 MG Oral Cap; Take 1 capsule (100 mg total) by mouth 2 (two) times daily as needed.  Dispense: 20 capsule; Refill: 0    The patient indicates understanding of  these issues and agrees to the plan.      Cathleen Aguilar MD

## 2024-03-15 NOTE — TELEPHONE ENCOUNTER
Current Outpatient Medications:     fluticasone propionate 50 MCG/ACT Nasal Suspension, 2 sprays by Each Nare route daily., Disp: 15.8 mL, Rfl: 0

## 2024-03-15 NOTE — TELEPHONE ENCOUNTER
Resent Rx as requested to [facility].     Called [facility] to cancel scripts. Left voicemail.     Updated patient that scripts were sent to [facility], however, they first need to be canceled at [facility]. Rn did leave a voicemail for them but it might not happen immediately. She verbalized understanding.

## 2024-03-18 NOTE — TELEPHONE ENCOUNTER
Soraya Cates  P Em Rn Triage (supporting Kirsten Zollinger, CPhT)10 days ago       Albert Baker,     I filled this prescription after my most recent appt. I did not request the refill and do not need it. Please let me know if you have any questions.      Thank you,  Caitlin Kirsten Zollinger, CPhT Caitlin Laura Tremble10 days ago     ELLEN Lira,  We received a refill request for Fluticasone Nasal [place]issevain from your [facility] pharmacy. Are you still using this medication? Did you request this refill?     If you have any questions, please call our office at [phone] and request to speak with the nurse.      Thank you,  Mary Baker

## 2024-08-21 NOTE — PLAN OF CARE
Problem: INADEQUATE LATCH, SUCK OR SWALLOW  Goal: Demonstrate ability to latch and sustain latch, audible swallowing and satiety  INTERVENTIONS:  - Assess oral anatomy, notify LIP for abnormal findings  - Hand expression  - Maximize feeding opportunity (sk Quality 226: Preventive Care And Screening: Tobacco Use: Screening And Cessation Intervention: Patient screened for tobacco use and is an ex/non-smoker Detail Level: Detailed Quality 130: Documentation Of Current Medications In The Medical Record: Current Medications Documented Quality 431: Preventive Care And Screening: Unhealthy Alcohol Use - Screening: Patient not identified as an unhealthy alcohol user when screened for unhealthy alcohol use using a systematic screening method

## (undated) NOTE — LETTER
VACCINE ADMINISTRATION RECORD  PARENT / GUARDIAN APPROVAL  Date: 10/20/2021  Vaccine administered to: Sena Harrison     : 1985    MRN: [id number]    A copy of the appropriate Centers for Disease Control and Prevention Vaccine Information state

## (undated) NOTE — IP AVS SNAPSHOT
[address]  [address] [facility] [phone]                Discharge Summary   5/9/2017    Aldo Gathers           Admission Information        Provider Department    5/9/2017 Qasim Pfeiffer MD [facility] Return OB with Ambar Gomez, [address] [facility], [address] ([facility])    [address] [place] [id number] [phone]            Jun 01, 2017  2:50 PM   Return OB with Kale Raygoza [facility] 5/13/1992, 8/18/1988, 7/6/1987, 5/19/1987    TD 3/31/2000    TDAP 3/13/2017, 11/25/2015    Varicella Deferred (Had Chicken Pox) 1/1/1989      Recent Hematology Lab Results  (Last 3 results in the past 90 days)    WBC RBC Hemoglobin Hematocrit MCV [facility] You can access your MyChart to more actively manage your health care and view more details from this visit by going to [email or web address].   If you've recently had a stay at the Hospital you can access your discharge instructions in [address] by dominga

## (undated) NOTE — Clinical Note
VACCINE ADMINISTRATION RECORD  PARENT / GUARDIAN APPROVAL  Date: 3/13/2017  Vaccine administered to: Florencio Ramirez     : 1985    MRN: [id number]    A copy of the appropriate Centers for Disease Control and Prevention Vaccine Information stateme

## (undated) NOTE — IP AVS SNAPSHOT
Patient Demographics     Address Phone E-mail Address    [address] [phone] [facility]  [phone] [email or web address]      Emergency Contact(s)     Name Relation Home Work Mobile    [place] Spouse   [phone] Video Swallow Study Notes     No notes of this type exist for this encounter. SLP Notes     No notes of this type exist for this encounter.             Immunizations     Name Date      DTAP 05/13/92     DTAP 08/18/88     DTAP 09/03/87     DTAP 07/06/87

## (undated) NOTE — Clinical Note
IUP at 37w4d Normal level II ultrasound (too late in gestation for aneuploidy screen), normal kidneys High normal HANNA at 22 cm  RECOMMENDATIONS: Continue care with Dr. Elisa Vasquez

## (undated) NOTE — IP AVS SNAPSHOT
[address]  [address] [facility] [phone]                Discharge Summary   6/4/2017    [facility]           Admission Information        Provider Department    6/4/2017 Carl Balbuena DO [facility] 3 · Visual changes (seeing spots, blurred vision or partial vision loss)  · Feeling nauseated or vomiting  · Stomach pain/heartburn  · Swelling in your hands, feet or face  · Sudden weight gain  · Shortness of breath  · \"Just not feeling right\"        Foll (06/06/17)  86 (06/06/17)  8 (06/06/17)  7 (06/06/17)  0 (06/06/17)  0 -- (06/06/17)  19.4 (H) (06/06/17)  1.7 (06/06/17)  1.5 (H) (06/06/17)  0.0 (06/06/17)  0.1    (05/05/17)  74 (05/05/17)  20 (05/05/17)  6 (05/05/17)  0 (05/05/17)  0  (05/05/17)  11. 6 Signs and symptoms / prevention / management of engorgement if applicable Done   Signs and symptoms / prevention / management of plugged ducts / mastitis if applicable Done   Signs and symptoms / prevention / management of thrush if applicable Done   Hospi Review Plan of Care Resolved   Pain Management Resolved   Fetal Monitoring Resolved   Delivery Process Resolved   Tocolytics Resolved   Psychosocial/Spiritual Support Resolved   Community Resources Resolved         Handwashing & Infection Prevention  Activ discharge instructions in [employer] by going to Visits < Admission Summaries. If you've been to the Emergency Department or your doctor's office, you can view your past visit information in [employer] by going to Visits < Visit Summaries. [employer] questions?

## (undated) NOTE — LETTER
[facility]  Administration of Anesthesia  1.  Ninfa Sanders, or _________________________________ acting on her behalf, (Patient) (Dependent/Representative) request to receive anesthesia for my pending procedure/operation/treatme spinal bleeding, seizure, cardiac arrest and death. 7. AWARENESS: I understand that it is possible (but unlikely) to have explicit memory of events from the operating room while under general anesthesia.   8. ELECTROCONVULSIVE THERAPY PATIENTS: This consen signature below affirms that prior to the time of the procedure, I have explained to the patient and/or his/her guardian, the risks and benefits of undergoing anesthesia, as well as any reasonable alternatives.     __________________________________________